# Patient Record
Sex: FEMALE | Race: WHITE | NOT HISPANIC OR LATINO | Employment: UNEMPLOYED | URBAN - NONMETROPOLITAN AREA
[De-identification: names, ages, dates, MRNs, and addresses within clinical notes are randomized per-mention and may not be internally consistent; named-entity substitution may affect disease eponyms.]

---

## 2017-06-29 ENCOUNTER — OFFICE VISIT (OUTPATIENT)
Dept: OBSTETRICS AND GYNECOLOGY | Facility: CLINIC | Age: 67
End: 2017-06-29

## 2017-06-29 VITALS — WEIGHT: 260 LBS | HEIGHT: 65 IN | BODY MASS INDEX: 43.32 KG/M2

## 2017-06-29 DIAGNOSIS — N31.9 NEUROGENIC BLADDER: Chronic | ICD-10-CM

## 2017-06-29 DIAGNOSIS — E66.01 MORBID OBESITY WITH BMI OF 40.0-44.9, ADULT (HCC): Chronic | ICD-10-CM

## 2017-06-29 DIAGNOSIS — M54.16 CHRONIC RADICULAR LUMBAR PAIN: Chronic | ICD-10-CM

## 2017-06-29 DIAGNOSIS — Z01.419 ENCOUNTER FOR ANNUAL ROUTINE GYNECOLOGICAL EXAMINATION: Primary | ICD-10-CM

## 2017-06-29 DIAGNOSIS — N95.1 MENOPAUSAL AND FEMALE CLIMACTERIC STATES: Chronic | ICD-10-CM

## 2017-06-29 DIAGNOSIS — F33.41 RECURRENT MAJOR DEPRESSIVE DISORDER, IN PARTIAL REMISSION (HCC): Chronic | ICD-10-CM

## 2017-06-29 DIAGNOSIS — Z12.31 ENCOUNTER FOR SCREENING MAMMOGRAM FOR BREAST CANCER: ICD-10-CM

## 2017-06-29 DIAGNOSIS — G89.29 CHRONIC RADICULAR LUMBAR PAIN: Chronic | ICD-10-CM

## 2017-06-29 DIAGNOSIS — M79.7 FIBROMYALGIA: Chronic | ICD-10-CM

## 2017-06-29 DIAGNOSIS — E03.9 ACQUIRED HYPOTHYROIDISM: Chronic | ICD-10-CM

## 2017-06-29 DIAGNOSIS — G47.33 OBSTRUCTIVE SLEEP APNEA SYNDROME: Chronic | ICD-10-CM

## 2017-06-29 DIAGNOSIS — G43.C0 PERIODIC HEADACHE SYNDROME, NOT INTRACTABLE: Chronic | ICD-10-CM

## 2017-06-29 PROCEDURE — 88142 CYTOPATH C/V THIN LAYER: CPT | Performed by: OBSTETRICS & GYNECOLOGY

## 2017-06-29 PROCEDURE — G0101 CA SCREEN;PELVIC/BREAST EXAM: HCPCS | Performed by: OBSTETRICS & GYNECOLOGY

## 2017-06-29 RX ORDER — OXYCODONE HYDROCHLORIDE 15 MG/1
TABLET ORAL
Refills: 0 | COMMUNITY
Start: 2017-06-14

## 2017-06-29 RX ORDER — LEVOTHYROXINE SODIUM 112 UG/1
TABLET ORAL
Refills: 11 | COMMUNITY
Start: 2017-06-08

## 2017-06-29 RX ORDER — LEVOCETIRIZINE DIHYDROCHLORIDE 5 MG/1
TABLET, FILM COATED ORAL
Refills: 0 | COMMUNITY
Start: 2017-05-31

## 2017-06-29 RX ORDER — DULOXETIN HYDROCHLORIDE 30 MG/1
CAPSULE, DELAYED RELEASE ORAL
COMMUNITY
Start: 2017-03-22 | End: 2017-08-09 | Stop reason: DRUGHIGH

## 2017-06-29 RX ORDER — FUROSEMIDE 40 MG/1
TABLET ORAL
Refills: 0 | COMMUNITY
Start: 2017-05-31

## 2017-06-29 RX ORDER — RANITIDINE 150 MG/1
TABLET ORAL
Refills: 0 | COMMUNITY
Start: 2017-05-31

## 2017-06-29 RX ORDER — IPRATROPIUM/ALBUTEROL SULFATE 20-100 MCG
MIST INHALER (GRAM) INHALATION
Refills: 0 | COMMUNITY
Start: 2017-04-10

## 2017-06-29 RX ORDER — RALOXIFENE HYDROCHLORIDE 60 MG/1
TABLET, FILM COATED ORAL
Refills: 2 | COMMUNITY
Start: 2017-06-09

## 2017-06-29 RX ORDER — POTASSIUM CHLORIDE 20 MEQ/1
TABLET, EXTENDED RELEASE ORAL
Refills: 11 | COMMUNITY
Start: 2017-05-04

## 2017-06-29 RX ORDER — CYCLOBENZAPRINE HCL 10 MG
TABLET ORAL
Refills: 2 | COMMUNITY
Start: 2017-06-08 | End: 2017-08-09

## 2017-06-29 RX ORDER — LACTULOSE 10 G/15ML
SOLUTION ORAL
Refills: 0 | COMMUNITY
Start: 2017-03-31

## 2017-06-29 RX ORDER — DOCUSATE SODIUM 100 MG/1
CAPSULE ORAL
Refills: 5 | COMMUNITY
Start: 2017-06-12

## 2017-06-29 RX ORDER — PROMETHAZINE HYDROCHLORIDE 25 MG/1
TABLET ORAL
Refills: 0 | COMMUNITY
Start: 2017-05-31

## 2017-06-29 RX ORDER — LIDOCAINE 50 MG/G
PATCH TOPICAL
Refills: 0 | COMMUNITY
Start: 2017-06-12

## 2017-06-29 RX ORDER — BENZONATATE 100 MG/1
CAPSULE ORAL
Refills: 1 | COMMUNITY
Start: 2017-06-08

## 2017-06-29 RX ORDER — BACLOFEN 10 MG/1
TABLET ORAL
Refills: 1 | COMMUNITY
Start: 2017-05-17 | End: 2017-08-09

## 2017-06-29 RX ORDER — PANTOPRAZOLE SODIUM 40 MG/1
TABLET, DELAYED RELEASE ORAL
Refills: 2 | COMMUNITY
Start: 2017-06-08

## 2017-06-29 RX ORDER — BUPROPION HYDROCHLORIDE 150 MG/1
150 TABLET, EXTENDED RELEASE ORAL 2 TIMES DAILY
Qty: 60 TABLET | Refills: 11 | Status: SHIPPED | OUTPATIENT
Start: 2017-06-29

## 2017-06-29 RX ORDER — CEFDINIR 300 MG/1
CAPSULE ORAL
Refills: 0 | COMMUNITY
Start: 2017-06-17 | End: 2017-08-09

## 2017-06-29 RX ORDER — ZIPRASIDONE HYDROCHLORIDE 20 MG/1
CAPSULE ORAL
Refills: 0 | COMMUNITY
Start: 2017-06-07 | End: 2017-08-09

## 2017-06-29 RX ORDER — DULOXETIN HYDROCHLORIDE 60 MG/1
CAPSULE, DELAYED RELEASE ORAL
Refills: 6 | COMMUNITY
Start: 2017-06-09

## 2017-06-29 RX ORDER — CLONAZEPAM 2 MG/1
TABLET ORAL
Refills: 5 | COMMUNITY
Start: 2017-06-08

## 2017-06-29 RX ORDER — CLOTRIMAZOLE AND BETAMETHASONE DIPROPIONATE 10; .64 MG/G; MG/G
CREAM TOPICAL
Refills: 0 | COMMUNITY
Start: 2017-05-31

## 2017-06-29 RX ORDER — DIPHENHYDRAMINE HYDROCHLORIDE 25 MG/1
CAPSULE ORAL
Refills: 12 | COMMUNITY
Start: 2017-06-12

## 2017-06-29 RX ORDER — UBIDECARENONE 100 MG
100 CAPSULE ORAL DAILY
COMMUNITY

## 2017-06-29 RX ORDER — AMOXICILLIN AND CLAVULANATE POTASSIUM 500; 125 MG/1; MG/1
TABLET, FILM COATED ORAL
Refills: 0 | COMMUNITY
Start: 2017-05-08 | End: 2017-08-09

## 2017-06-29 NOTE — PROGRESS NOTES
Estefani Carlos is a 66 y.o. y/o female     Chief Complaint: Establish care, annual GYN exam and Pap smear, severe vaginal atrophy, menopausal syndrome, and depression.    HPI: 66-year-old  with 2  sections who has had a hysterectomy without BSO.  She is the wife of Isai Carlos M.D.   She has severe disease of the lumbar and sacral spine which makes it very difficult for her to walk, and she gets around mainly in a wheelchair.  I have been her OB/GYN doctor since .  She takes Evista because she has a sister who was diagnosed with breast cancer at age 60, and she cannot tolerate bisphosphonates to treat her osteoporosis.  Estefani complains of severe hot flashes as well as severe vaginal dryness.  She has not had a Pap smear or pelvic exam since I performed it in 2009.  I reviewed her GYN records.    Review of Systems   Constitutional: Positive for fatigue and unexpected weight change. Negative for activity change, appetite change, chills, diaphoresis and fever.   Respiratory: Positive for cough.    Cardiovascular: Positive for leg swelling.   Gastrointestinal: Negative for abdominal pain, constipation, diarrhea and nausea.   Genitourinary: Negative for difficulty urinating, dyspareunia, dysuria, menstrual problem, pelvic pain, urgency, vaginal bleeding, vaginal discharge and vaginal pain.   Musculoskeletal: Positive for arthralgias, back pain, gait problem, joint swelling and myalgias.   Neurological: Positive for headaches.   Psychiatric/Behavioral: Positive for dysphoric mood and sleep disturbance. The patient is not nervous/anxious.       Breast ROS: negative    The following portions of the patient's history were reviewed and updated as appropriate: allergies, current medications, past family history, past medical history, past social history, past surgical history and problem list.    Allergies   Allergen Reactions   • Aspirin    • Baclofen    • Biaxin [Clarithromycin]    •  Celebrex [Celecoxib]    • Depakote Er [Divalproex Sodium Er]    • Gabapentin    • Nsaids    • Sulfa Antibiotics           Current Outpatient Prescriptions:   •  amoxicillin-clavulanate (AUGMENTIN) 500-125 MG per tablet, TAKE 1 TABLET BY MOUTH EVERY 12 HOURS, Disp: , Rfl: 0  •  baclofen (LIORESAL) 10 MG tablet, TAKE 1 TABLET BY MOUTH 3 TIMES A DAY, Disp: , Rfl: 1  •  BANOPHEN 25 MG capsule, TAKE 1 CAPSULE BY MOUTH FOUR TIMES DAILY, Disp: , Rfl: 12  •  benzonatate (TESSALON) 100 MG capsule, TAKE 1 CAPSULE BY MOUTH THREE TIMES DAILY, Disp: , Rfl: 1  •  buPROPion SR (WELLBUTRIN SR) 150 MG 12 hr tablet, Take 1 tablet by mouth 2 (Two) Times a Day., Disp: 60 tablet, Rfl: 11  •  cefdinir (OMNICEF) 300 MG capsule, TAKE 1 CAPSULE BY MOUTH TWICE DAILY, Disp: , Rfl: 0  •  ciclopirox (LOPROX) 0.77 % cream, APPLY TO AFFECTED AREA TWICE DAILY, Disp: , Rfl: 0  •  clonazePAM (KlonoPIN) 2 MG tablet, TAKE ONE TO TWO TABLETS BY MOUTH AT BEDTIME AS NEEDED FOR SLEEP, Disp: , Rfl: 5  •  clotrimazole-betamethasone (LOTRISONE) 1-0.05 % cream, APPLY TWICE DAILY, Disp: , Rfl: 0  •  coenzyme Q10 100 MG capsule, Take 100 mg by mouth Daily., Disp: , Rfl:   •  COMBIVENT RESPIMAT  MCG/ACT inhaler, INHALE 1 PUFF BY MOUTH FOUR TIMES DAILY, Disp: , Rfl: 0  •  conjugated estrogens (PREMARIN) 0.625 MG/GM vaginal cream, Insert  into the vagina Daily., Disp: 1 each, Rfl: 11  •  cyclobenzaprine (FLEXERIL) 10 MG tablet, TAKE 1 TABLET EVERY 8 HOURS AS NEEDED FOR MUSCLE PAIN, Disp: , Rfl: 2  •  DOCQLACE 100 MG capsule, TAKE AS DIRECTED, Disp: , Rfl: 5  •  DULoxetine (CYMBALTA) 30 MG capsule, , Disp: , Rfl:   •  DULoxetine (CYMBALTA) 60 MG capsule, TAKE 1 CAPSULE(S) BY MOUTH DAILY, Disp: , Rfl: 6  •  furosemide (LASIX) 40 MG tablet, TAKE 1 TABLET BY MOUTH ONCE DAILY, Disp: , Rfl: 0  •  lactulose (CHRONULAC) 10 GM/15ML solution, TAKE 10-15 MILLILITERS BY MOUTH TWICE DAILY OR AS DIRECTED, Disp: , Rfl: 0  •  levocetirizine (XYZAL) 5 MG tablet, TAKE 1  TABLET BY MOUTH ONCE DAILY, Disp: , Rfl: 0  •  levothyroxine (SYNTHROID, LEVOTHROID) 112 MCG tablet, TAKE 1 TABLET BY MOUTH ONCE DAILY, Disp: , Rfl: 11  •  lidocaine (LIDODERM) 5 %, APPLY 1 PATCH DAILY OR AS DIRECTED, Disp: , Rfl: 0  •  MUCINEX 600 MG 12 hr tablet, TAKE 1 TABLET BY MOUTH TWICE DAILY, Disp: , Rfl: 4  •  oxyCODONE (ROXICODONE) 15 MG immediate release tablet, TAKE 1 TABLET BY MOUTH FOUR TIMES DAILY AS NEEDED FOR 30 DAYS, Disp: , Rfl: 0  •  pantoprazole (PROTONIX) 40 MG EC tablet, TAKE 1 TABLET BY MOUTH ONCE DAILY, Disp: , Rfl: 2  •  potassium chloride (K-DUR,KLOR-CON) 20 MEQ CR tablet, TAKE 1 TABLET BY MOUTH DAILY ONLY IF TAKING LASIX, Disp: , Rfl: 11  •  promethazine (PHENERGAN) 25 MG tablet, TAKE 1 TABLET BY MOUTH EVERY SIX HOURS AS NEEDED FOR NAUSEA AND VOMITING, Disp: , Rfl: 0  •  raloxifene (EVISTA) 60 MG tablet, TAKE 1 TABLET BY MOUTH DAILY, Disp: , Rfl: 2  •  raNITIdine (ZANTAC) 150 MG tablet, TAKE 1 TABLET BY MOUTH TWICE DAILY, Disp: , Rfl: 0  •  verapamil SR (CALAN-SR) 120 MG CR tablet, TAKE 1 TABLET BY MOUTH ONCE DAILY, Disp: , Rfl: 11  •  ziprasidone (GEODON) 20 MG capsule, TAKE 1 CAPSULE BY MOUTH TWICE DAILY AS NEEDED FOR AGITATION, Disp: , Rfl: 0     The patient has a family history of   No family history on file.     Past Medical History:   Diagnosis Date   • Acquired hypothyroidism 6/29/2017   • Chronic radicular lumbar pain 6/29/2017   • Fibromyalgia 6/29/2017   • Menopausal and female climacteric states 6/29/2017   • Morbid obesity with BMI of 40.0-44.9, adult 6/29/2017   • Neurogenic bladder 6/29/2017   • Obstructive sleep apnea syndrome 6/29/2017   • Periodic headache syndrome, not intractable 6/29/2017   • Recurrent major depressive disorder, in partial remission 6/29/2017        OB History     No data available           Social History     Social History   • Marital status:      Spouse name: N/A   • Number of children: N/A   • Years of education: N/A     Occupational History  "  • Not on file.     Social History Main Topics   • Smoking status: Never Smoker   • Smokeless tobacco: Not on file   • Alcohol use Not on file   • Drug use: Not on file   • Sexual activity: Not on file     Other Topics Concern   • Not on file     Social History Narrative   • No narrative on file        No past surgical history on file.     Patient Active Problem List   Diagnosis   • Menopausal and female climacteric states   • Recurrent major depressive disorder, in partial remission   • Morbid obesity with BMI of 40.0-44.9, adult   • Acquired hypothyroidism   • Fibromyalgia   • Obstructive sleep apnea syndrome   • Neurogenic bladder   • Chronic radicular lumbar pain   • Periodic headache syndrome, not intractable        Documented Vitals    06/29/17 1443   Weight: 260 lb (118 kg)   Height: 65\" (165.1 cm)   PainSc: 0-No pain       Physical Exam   Constitutional: She is oriented to person, place, and time. No distress.   Obese white female weighing 260 pounds with BMI of 43.3.   HENT:   Head: Normocephalic and atraumatic.   Eyes: Conjunctivae and EOM are normal. Pupils are equal, round, and reactive to light.   Neck: Normal range of motion. Neck supple. No JVD present. No tracheal deviation present. No thyromegaly present.   Cardiovascular: Normal rate, regular rhythm, normal heart sounds and intact distal pulses.  Exam reveals no gallop and no friction rub.    No murmur heard.  Pulmonary/Chest: Effort normal and breath sounds normal. No stridor. No respiratory distress. She has no wheezes. She has no rales. She exhibits no tenderness. Right breast exhibits no inverted nipple, no mass, no nipple discharge, no skin change and no tenderness. Left breast exhibits no inverted nipple, no mass, no nipple discharge, no skin change and no tenderness. Breasts are symmetrical. There is no breast swelling.   Abdominal: Soft. Bowel sounds are normal. She exhibits no distension and no mass. There is no tenderness. There is no " rebound and no guarding. No hernia. Hernia confirmed negative in the right inguinal area and confirmed negative in the left inguinal area.   Genitourinary: Rectal exam shows no external hemorrhoid, no internal hemorrhoid, no fissure, no mass, no tenderness, anal tone normal and guaiac negative stool. No breast tenderness, discharge or bleeding. No labial fusion. There is no rash, tenderness, lesion or injury on the right labia. There is no rash, tenderness, lesion or injury on the left labia. There is erythema and tenderness in the vagina. No bleeding in the vagina. No foreign body in the vagina. No signs of injury around the vagina. No vaginal discharge found.   Genitourinary Comments: Severe vaginal atrophy.  Pap smear of the vaginal cuff performed.  Cervix surgically absent.  Uterus surgically absent.  Adnexa surgically absent.  Urethral meatus without prolapse or erythema.     Musculoskeletal: Normal range of motion. She exhibits no edema, tenderness or deformity.   Lymphadenopathy:     She has no cervical adenopathy.        Right: No inguinal adenopathy present.        Left: No inguinal adenopathy present.   Neurological: She is alert and oriented to person, place, and time. She has normal reflexes. She displays normal reflexes. No cranial nerve deficit. She exhibits normal muscle tone. Coordination normal.   Skin: Skin is warm and dry. No rash noted. She is not diaphoretic. No erythema. No pallor.   Psychiatric: She has a normal mood and affect. Her behavior is normal. Judgment and thought content normal.   Nursing note and vitals reviewed.       Assessment        Diagnosis Plan   1. Encounter for annual routine gynecological examination  Liquid-based Pap Smear, Screening   2. Menopausal and female climacteric states     3. Recurrent major depressive disorder, in partial remission     4. Morbid obesity with BMI of 40.0-44.9, adult     5. Acquired hypothyroidism     6. Fibromyalgia     7. Obstructive sleep  apnea syndrome     8. Neurogenic bladder     9. Chronic radicular lumbar pain     10. Periodic headache syndrome, not intractable     11. Encounter for screening mammogram for breast cancer  Mammo Screening Digital Tomosynthesis Bilateral With CAD         Plan    1.  Wellbutrin  mg twice a day.  2.  Recommend vitamin D3 and liquid B12.  3.  Premarin vaginal cream.  4.  Handouts on stress reduction, hot flashes, and depression.  5.  Follow-up in 6 weeks.  Follow-up sooner as needed.                  This document has been electronically signed by Demian Andre MD on June 29, 2017 5:23 PM

## 2017-07-07 LAB
LAB AP CASE REPORT: NORMAL
LAB AP GYN ADDITIONAL INFORMATION: NORMAL
LAB AP GYN OTHER FINDINGS: NORMAL
Lab: NORMAL
PATH INTERP SPEC-IMP: NORMAL
STAT OF ADQ CVX/VAG CYTO-IMP: NORMAL

## 2017-08-09 ENCOUNTER — OFFICE VISIT (OUTPATIENT)
Dept: OBSTETRICS AND GYNECOLOGY | Facility: CLINIC | Age: 67
End: 2017-08-09

## 2017-08-09 VITALS
BODY MASS INDEX: 42.85 KG/M2 | SYSTOLIC BLOOD PRESSURE: 135 MMHG | DIASTOLIC BLOOD PRESSURE: 70 MMHG | HEIGHT: 65 IN | WEIGHT: 257.2 LBS

## 2017-08-09 DIAGNOSIS — N31.9 NEUROGENIC BLADDER: Chronic | ICD-10-CM

## 2017-08-09 DIAGNOSIS — N95.1 MENOPAUSAL AND FEMALE CLIMACTERIC STATES: Primary | Chronic | ICD-10-CM

## 2017-08-09 DIAGNOSIS — E66.01 MORBID OBESITY WITH BMI OF 40.0-44.9, ADULT (HCC): Chronic | ICD-10-CM

## 2017-08-09 DIAGNOSIS — N95.2 VAGINAL ATROPHY: Chronic | ICD-10-CM

## 2017-08-09 DIAGNOSIS — F33.41 RECURRENT MAJOR DEPRESSIVE DISORDER, IN PARTIAL REMISSION (HCC): Chronic | ICD-10-CM

## 2017-08-09 PROBLEM — Z90.710 H/O HYSTERECTOMY FOR BENIGN DISEASE: Chronic | Status: ACTIVE | Noted: 2017-08-09

## 2017-08-09 PROCEDURE — 99214 OFFICE O/P EST MOD 30 MIN: CPT | Performed by: OBSTETRICS & GYNECOLOGY

## 2017-08-10 NOTE — PROGRESS NOTES
Estefani Carlos is a 66 y.o. y/o female     Chief Complaint: Lethargy, menopausal syndrome, depression, and severe vaginal atrophy    HPI: 66-year-old  with two  sections who has had a hysterectomy without BSO.  She is the wife of Isai Carlos M.D.   She has severe disease of the lumbar and sacral spine which makes it very difficult for her to walk, and she gets around mainly in a wheelchair.  I have been her OB/GYN doctor since .  She takes Evista because she has a sister who was diagnosed with breast cancer at age 60, and she cannot tolerate bisphosphonates to treat her osteoporosis.  Estefani complains of severe hot flashes as well as severe vaginal dryness.    The Premarin vaginal cream has helped some with the severe vaginal dryness, and the Wellbutrin is helping a little bit with her hot flashes.  She has also been able to lose a little weight.  It is also helping with her depression.    Review of Systems   Constitutional: Positive for fatigue. Negative for activity change, appetite change, chills, diaphoresis, fever and unexpected weight change.   Gastrointestinal: Negative for abdominal pain, constipation, diarrhea and nausea.   Genitourinary: Positive for dyspareunia ( Somewhat improved.) and pelvic pain. Negative for difficulty urinating, dysuria, menstrual problem, urgency, vaginal bleeding, vaginal discharge and vaginal pain.   Musculoskeletal: Positive for arthralgias, back pain, gait problem, joint swelling, myalgias, neck pain and neck stiffness.   Neurological: Negative for headaches.   Psychiatric/Behavioral: Positive for dysphoric mood ( Somewhat improved.). The patient is not nervous/anxious.    All other systems reviewed and are negative.     Breast ROS: negative    The following portions of the patient's history were reviewed and updated as appropriate: allergies, current medications, past family history, past medical history, past social history, past surgical history  and problem list.    Allergies   Allergen Reactions   • Aspirin    • Baclofen    • Biaxin [Clarithromycin]    • Celebrex [Celecoxib]    • Depakote Er [Divalproex Sodium Er]    • Gabapentin    • Nsaids    • Sulfa Antibiotics           Current Outpatient Prescriptions:   •  BANOPHEN 25 MG capsule, TAKE 1 CAPSULE BY MOUTH FOUR TIMES DAILY, Disp: , Rfl: 12  •  benzonatate (TESSALON) 100 MG capsule, TAKE 1 CAPSULE BY MOUTH THREE TIMES DAILY, Disp: , Rfl: 1  •  buPROPion SR (WELLBUTRIN SR) 150 MG 12 hr tablet, Take 1 tablet by mouth 2 (Two) Times a Day., Disp: 60 tablet, Rfl: 11  •  ciclopirox (LOPROX) 0.77 % cream, APPLY TO AFFECTED AREA TWICE DAILY, Disp: , Rfl: 0  •  clonazePAM (KlonoPIN) 2 MG tablet, TAKE ONE TO TWO TABLETS BY MOUTH AT BEDTIME AS NEEDED FOR SLEEP, Disp: , Rfl: 5  •  clotrimazole-betamethasone (LOTRISONE) 1-0.05 % cream, APPLY TWICE DAILY, Disp: , Rfl: 0  •  coenzyme Q10 100 MG capsule, Take 100 mg by mouth Daily., Disp: , Rfl:   •  COMBIVENT RESPIMAT  MCG/ACT inhaler, INHALE 1 PUFF BY MOUTH FOUR TIMES DAILY, Disp: , Rfl: 0  •  conjugated estrogens (PREMARIN) 0.625 MG/GM vaginal cream, Insert  into the vagina Daily., Disp: 1 each, Rfl: 11  •  DOCQLACE 100 MG capsule, TAKE AS DIRECTED, Disp: , Rfl: 5  •  DULoxetine (CYMBALTA) 60 MG capsule, TAKE 1 CAPSULE(S) BY MOUTH DAILY, Disp: , Rfl: 6  •  furosemide (LASIX) 40 MG tablet, TAKE 1 TABLET BY MOUTH ONCE DAILY, Disp: , Rfl: 0  •  lactulose (CHRONULAC) 10 GM/15ML solution, TAKE 10-15 MILLILITERS BY MOUTH TWICE DAILY OR AS DIRECTED, Disp: , Rfl: 0  •  levocetirizine (XYZAL) 5 MG tablet, TAKE 1 TABLET BY MOUTH ONCE DAILY, Disp: , Rfl: 0  •  levothyroxine (SYNTHROID, LEVOTHROID) 112 MCG tablet, TAKE 1 TABLET BY MOUTH ONCE DAILY, Disp: , Rfl: 11  •  lidocaine (LIDODERM) 5 %, APPLY 1 PATCH DAILY OR AS DIRECTED, Disp: , Rfl: 0  •  MUCINEX 600 MG 12 hr tablet, TAKE 1 TABLET BY MOUTH TWICE DAILY, Disp: , Rfl: 4  •  oxyCODONE (ROXICODONE) 15 MG immediate  release tablet, TAKE 1 TABLET BY MOUTH FOUR TIMES DAILY AS NEEDED FOR 30 DAYS, Disp: , Rfl: 0  •  pantoprazole (PROTONIX) 40 MG EC tablet, TAKE 1 TABLET BY MOUTH ONCE DAILY, Disp: , Rfl: 2  •  potassium chloride (K-DUR,KLOR-CON) 20 MEQ CR tablet, TAKE 1 TABLET BY MOUTH DAILY ONLY IF TAKING LASIX, Disp: , Rfl: 11  •  promethazine (PHENERGAN) 25 MG tablet, TAKE 1 TABLET BY MOUTH EVERY SIX HOURS AS NEEDED FOR NAUSEA AND VOMITING, Disp: , Rfl: 0  •  raloxifene (EVISTA) 60 MG tablet, TAKE 1 TABLET BY MOUTH DAILY, Disp: , Rfl: 2  •  raNITIdine (ZANTAC) 150 MG tablet, TAKE 1 TABLET BY MOUTH TWICE DAILY, Disp: , Rfl: 0  •  verapamil SR (CALAN-SR) 120 MG CR tablet, TAKE 1 TABLET BY MOUTH ONCE DAILY, Disp: , Rfl: 11     The patient has a family history of   No family history on file.     Past Medical History:   Diagnosis Date   • Acquired hypothyroidism 6/29/2017   • Chronic radicular lumbar pain 6/29/2017   • Fibromyalgia 6/29/2017   • Menopausal and female climacteric states 6/29/2017   • Morbid obesity with BMI of 40.0-44.9, adult 6/29/2017   • Neurogenic bladder 6/29/2017   • Obstructive sleep apnea syndrome 6/29/2017   • Periodic headache syndrome, not intractable 6/29/2017   • Recurrent major depressive disorder, in partial remission 6/29/2017   • Vaginal atrophy 8/9/2017        OB History     No data available           Social History     Social History   • Marital status:      Spouse name: N/A   • Number of children: N/A   • Years of education: N/A     Occupational History   • Not on file.     Social History Main Topics   • Smoking status: Never Smoker   • Smokeless tobacco: Never Used   • Alcohol use No   • Drug use: No   • Sexual activity: Not on file     Other Topics Concern   • Not on file     Social History Narrative        No past surgical history on file.     Patient Active Problem List   Diagnosis   • Menopausal and female climacteric states   • Recurrent major depressive disorder, in partial remission  "  • Morbid obesity with BMI of 40.0-44.9, adult   • Acquired hypothyroidism   • Fibromyalgia   • Obstructive sleep apnea syndrome   • Neurogenic bladder   • Chronic radicular lumbar pain   • Periodic headache syndrome, not intractable   • Vaginal atrophy        Documented Vitals    08/09/17 1416   BP: 135/70   Weight: 257 lb 3.2 oz (117 kg)   Height: 65\" (165.1 cm)   PainSc:   6   PainLoc: Back       Physical Exam   Constitutional: She is oriented to person, place, and time. No distress.   Obese white female weighing 257.2 pounds with BMI 42.8.   HENT:   Head: Normocephalic and atraumatic.   Eyes: Conjunctivae and EOM are normal. Pupils are equal, round, and reactive to light.   Neck: Normal range of motion. Neck supple. No JVD present. No tracheal deviation present. No thyromegaly present.   Cardiovascular: Normal rate, regular rhythm, normal heart sounds and intact distal pulses.  Exam reveals no gallop and no friction rub.    No murmur heard.  Pulmonary/Chest: Effort normal and breath sounds normal. No stridor. No respiratory distress. She has no wheezes. She has no rales. She exhibits no tenderness.   Abdominal: Soft. Bowel sounds are normal. She exhibits no distension and no mass. There is no tenderness. There is no rebound and no guarding. No hernia.   Musculoskeletal: Normal range of motion. She exhibits no edema, tenderness or deformity.   Lymphadenopathy:     She has no cervical adenopathy.   Neurological: She is alert and oriented to person, place, and time. She has normal reflexes. She displays normal reflexes. No cranial nerve deficit. She exhibits normal muscle tone. Coordination normal.   Skin: Skin is warm and dry. No rash noted. She is not diaphoretic. No erythema. No pallor.   Psychiatric: She has a normal mood and affect. Her behavior is normal. Judgment and thought content normal.   Nursing note and vitals reviewed.       Assessment        Diagnosis Plan   1. Menopausal and female climacteric " states     2. Recurrent major depressive disorder, in partial remission     3. Neurogenic bladder     4. Morbid obesity with BMI of 40.0-44.9, adult     5. Vaginal atrophy           Plan      1. Continue Premarin vaginal cream and Wellbutrin SR.  2. Encouraged in diet and exercise.   3. Follow-up in 3 months..  Follow-up sooner as needed.            This document has been electronically signed by Demian Andre MD on August 9, 2017 7:47 PM

## 2018-10-30 ENCOUNTER — TRANSCRIBE ORDERS (OUTPATIENT)
Dept: PHYSICAL THERAPY | Facility: HOSPITAL | Age: 68
End: 2018-10-30

## 2018-10-30 DIAGNOSIS — M17.0 BILATERAL PRIMARY OSTEOARTHRITIS OF KNEE: Primary | ICD-10-CM

## 2018-11-05 ENCOUNTER — APPOINTMENT (OUTPATIENT)
Dept: PHYSICAL THERAPY | Facility: HOSPITAL | Age: 68
End: 2018-11-05

## 2018-11-07 ENCOUNTER — HOSPITAL ENCOUNTER (OUTPATIENT)
Dept: PHYSICAL THERAPY | Facility: HOSPITAL | Age: 68
Setting detail: THERAPIES SERIES
End: 2018-11-07

## 2018-12-03 ENCOUNTER — TRANSCRIBE ORDERS (OUTPATIENT)
Dept: PHYSICAL THERAPY | Facility: HOSPITAL | Age: 68
End: 2018-12-03

## 2018-12-03 DIAGNOSIS — R10.9 ABDOMINAL PAIN, UNSPECIFIED ABDOMINAL LOCATION: Primary | ICD-10-CM

## 2018-12-17 ENCOUNTER — HOSPITAL ENCOUNTER (OUTPATIENT)
Dept: PHYSICAL THERAPY | Facility: HOSPITAL | Age: 68
Setting detail: THERAPIES SERIES
Discharge: HOME | End: 2018-12-17

## 2018-12-17 DIAGNOSIS — M19.012 PRIMARY OSTEOARTHRITIS OF BOTH SHOULDERS: ICD-10-CM

## 2018-12-17 DIAGNOSIS — M17.0 BILATERAL PRIMARY OSTEOARTHRITIS OF KNEE: Primary | ICD-10-CM

## 2018-12-17 DIAGNOSIS — M19.011 PRIMARY OSTEOARTHRITIS OF BOTH SHOULDERS: ICD-10-CM

## 2018-12-17 PROCEDURE — 97162 PT EVAL MOD COMPLEX 30 MIN: CPT | Performed by: PHYSICAL THERAPIST

## 2018-12-17 PROCEDURE — G8978 MOBILITY CURRENT STATUS: HCPCS | Performed by: PHYSICAL THERAPIST

## 2018-12-17 PROCEDURE — G8979 MOBILITY GOAL STATUS: HCPCS | Performed by: PHYSICAL THERAPIST
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
## 2018-12-20 ENCOUNTER — APPOINTMENT (OUTPATIENT)
Dept: PHYSICAL THERAPY | Facility: HOSPITAL | Age: 68
End: 2018-12-20

## 2018-12-26 ENCOUNTER — APPOINTMENT (OUTPATIENT)
Dept: PHYSICAL THERAPY | Facility: HOSPITAL | Age: 68
End: 2018-12-26

## 2018-12-27 ENCOUNTER — APPOINTMENT (OUTPATIENT)
Dept: PHYSICAL THERAPY | Facility: HOSPITAL | Age: 68
End: 2018-12-27

## 2019-01-07 ENCOUNTER — HOSPITAL ENCOUNTER (OUTPATIENT)
Dept: PHYSICAL THERAPY | Facility: HOSPITAL | Age: 69
Setting detail: THERAPIES SERIES
Discharge: HOME | End: 2019-01-07

## 2019-01-07 DIAGNOSIS — M19.011 PRIMARY OSTEOARTHRITIS OF BOTH SHOULDERS: ICD-10-CM

## 2019-01-07 DIAGNOSIS — M17.0 BILATERAL PRIMARY OSTEOARTHRITIS OF KNEE: Primary | ICD-10-CM

## 2019-01-07 DIAGNOSIS — M19.012 PRIMARY OSTEOARTHRITIS OF BOTH SHOULDERS: ICD-10-CM

## 2019-01-07 PROCEDURE — 97110 THERAPEUTIC EXERCISES: CPT

## 2019-01-09 ENCOUNTER — HOSPITAL ENCOUNTER (OUTPATIENT)
Dept: PHYSICAL THERAPY | Facility: HOSPITAL | Age: 69
Setting detail: THERAPIES SERIES
Discharge: HOME | End: 2019-01-09

## 2019-01-09 DIAGNOSIS — M17.0 BILATERAL PRIMARY OSTEOARTHRITIS OF KNEE: Primary | ICD-10-CM

## 2019-01-09 DIAGNOSIS — M19.011 PRIMARY OSTEOARTHRITIS OF BOTH SHOULDERS: ICD-10-CM

## 2019-01-09 DIAGNOSIS — M19.012 PRIMARY OSTEOARTHRITIS OF BOTH SHOULDERS: ICD-10-CM

## 2019-01-09 PROCEDURE — 97110 THERAPEUTIC EXERCISES: CPT

## 2019-01-09 NOTE — THERAPY TREATMENT NOTE
Outpatient Physical Therapy Ortho Treatment Note  [facility]  Raissa Wells PTA       Patient Name: Estefani Carlos  : 1950  MRN: [id number]  Today's Date: 2019      Visit Date: 2019     Visits: 3/4  Insurance Visits Approved: based on medicare guidelines  Recert Due: 2019  MD Appt: TBD  Pain: pretreatment 0/10; post treatment 5/10 low back   Improvement: pt is subjectively reporting 0% improvement since initial evaluation        Visit Dx:    ICD-10-CM ICD-9-CM   1. Bilateral primary osteoarthritis of knee M17.0 715.16   2. Primary osteoarthritis of both shoulders M19.011 715.11    M19.012        Patient Active Problem List   Diagnosis   • Menopausal and female climacteric states   • Recurrent major depressive disorder, in partial remission (CMS/HCC)   • Morbid obesity with BMI of 40.0-44.9, adult (CMS/[facility])   • Acquired hypothyroidism   • Fibromyalgia   • Obstructive sleep apnea syndrome   • Neurogenic bladder   • Chronic radicular lumbar pain   • Periodic headache syndrome, not intractable   • Vaginal atrophy   • H/O hysterectomy for benign disease        Past Medical History:   Diagnosis Date   • Acquired hypothyroidism 2017   • Bilateral shoulder pain    • Chronic radicular lumbar pain 2017   • DDD (degenerative disc disease), cervical    • Fibromyalgia 2017   • H/O hysterectomy for benign disease 2017    She still has her ovaries.   • Menopausal and female climacteric states 2017   • Morbid obesity with BMI of 40.0-44.9, adult (CMS/HCC) 2017   • Neurogenic bladder 2017   • Obstructive sleep apnea syndrome 2017   • Periodic headache syndrome, not intractable 2017   • Recurrent major depressive disorder, in partial remission (CMS/HCC) 2017   • Vaginal atrophy 2017        Past Surgical History:   Procedure Laterality Date   • BACK SURGERY     •  SECTION     • CHOLECYSTECTOMY     • COLONOSCOPY     •  HYSTERECTOMY     • KNEE ARTHROSCOPY Right    • LUMBAR FUSION         PT Ortho     Row Name 01/09/19 1400       Subjective Comments    Subjective Comments  pt intially states that she is doing ok today. denies pain. states that she was glad that she has a day or so between treatments because she was very sore after the last treatment. states that she went to Dr. Toussaint yesterday and he did a bunch of blood work. states that they got to feeling around her legs and have decided to do a scan of her legs. describes a doppler/ultrasound, and also reports that they are doing a chest xray. she states that Dr. Toussaint mentioned blood clots. After getting out of the pool patient states that her  states they are only looking for vericose veins. States that her back is hurting worse after being in the treatment.   -       Precautions and Contraindications    Precautions/Limitations  fall precautions  -       Subjective Pain    Able to rate subjective pain?  yes  -    Pre-Treatment Pain Level  0  -    Post-Treatment Pain Level  5  -    Subjective Pain Comment  pain is in her back, starts after she is doing some wlaking in the pool  -       Posture/Observations    Posture/Observations Comments  pt is acompanied by her caregiver who gets in the pool iwth her. patient has two large bruises noted on her legs. one on each. right leg is medial and below knee, left is medial and below the knee.   -    Row Name 01/07/19 1400       Subjective Comments    Subjective Comments  pt states that she walks very little regino taking 1-2 steps at a time. states that she uses her power wheelchair in the house. has a bedside commode. rarely goes into the restroom for toileting. questions how long she has to stay in the pool. doesn't feel like she can stay in the pool 30 minutes at all. reports during reatment that her back is starting to hurt. has a caregiver. she will be getting in the pool with her to learn so that she may assist  "the patient upon discharge  -       Precautions and Contraindications    Precautions/Limitations  fall precautions  -       Subjective Pain    Able to rate subjective pain?  yes  -    Pre-Treatment Pain Level  6 left knee  -    Post-Treatment Pain Level  7 low back, \"same\" in her left knee  -      User Key  (r) = Recorded By, (t) = Taken By, (c) = Cosigned By    Initials Name Provider Type     Raissa Wells PTA Physical Therapy Assistant                      PT Assessment/Plan     Row Name 01/09/19 1400          PT Assessment    Assessment Comments  after disucssion with patient regarding upcoming tests and her eluding to the possibility of blood clots, treatment is terminated and will be held until medical documentaiton is presented clearing her of blood clots or medical documentation showing that was not what the tests were intended for and there is no concern for blood clot. patient does have increased pain with minimal treatment in the pool  -        PT Plan    PT Frequency  2x/week pool only  -     PT Plan Comments  hold treatment until medical documentation is provided clearing patient of blood clots of the possibility of blood clots  -       User Key  (r) = Recorded By, (t) = Taken By, (c) = Cosigned By    Initials Name Provider Type     Raissa Wells PTA Physical Therapy Assistant              Exercises     Row Name 01/09/19 1400             Subjective Comments    Subjective Comments  pt intially states that she is doing ok today. denies pain. states that she was glad that she has a day or so between treatments because she was very sore after the last treatment. states that she went to Dr. Toussaint yesterday and he did a bunch of blood work. states that they got to feeling around her legs and have decided to do a scan of her legs. describes a doppler/ultrasound, and also reports that they are doing a chest xray. she states that Dr. Toussaint mentioned blood clots. After getting out of the " pool patient states that her  states they are only looking for vericose veins. States that her back is hurting worse after being in the treatment.   -         Subjective Pain    Able to rate subjective pain?  yes  -      Pre-Treatment Pain Level  0  -      Post-Treatment Pain Level  5  -      Subjective Pain Comment  pain is in her back, starts after she is doing some wlaking in the pool  -         Exercise 1    Exercise Name 1  aqua: Transfer to lift to enter pool  -         Exercise 2    Exercise Name 2  aqua: Walk Fwd, retro, lat  -      Time 2  5 mins each  -         Exercise 3    Exercise Name 3  aqua: Transfer out of pool via lift  -      Time 3  5 mins  -        User Key  (r) = Recorded By, (t) = Taken By, (c) = Cosigned By    Initials Name Provider Type    Raissa Cm PTA Physical Therapy Assistant                         PT OP Goals     Row Name 01/09/19 1400          PT Short Term Goals    STG Date to Achieve  01/07/19  -     STG 1  Pt and caregiver independent with aquatic therapy  -     STG 1 Progress  Ongoing  -     STG 2  Able to tolerate 30 minutes of continuous UE/LE therex in the pool  -     STG 2 Progress  Ongoing  [facility]     STG 3  Able to complete 15-20 reps of each aquatic exercise without minimal fatigue noted  -     STG 3 Progress  [facility]        Time Calculation    PT Goal Re-Cert Due Date  01/07/19  -       User Key  (r) = Recorded By, (t) = Taken By, (c) = Cosigned By    Initials Name Provider Type    Raissa Cm PTA Physical Therapy Assistant          Therapy Education  Given: Posture/body mechanics, Fall prevention and home safety, Pain management  Program: Reinforced  How Provided: Verbal  Provided to: Patient, Caregiver  Level of Understanding: Verbalized, Demonstrated              Time Calculation:   Start Time: 1350  Stop Time: 1425  Time Calculation (min): 35 min  Total Timed Code Minutes- PT: 35 minute(s)    Therapy Charges for  Today     Code Description Service Date Service Provider Modifiers Qty    19574020842 HC PT THER PROC EA 15 MIN 1/9/2019 Raissa Wells PTA GP 2    77781875710 HC PT THER SUPP EA 15 MIN 1/9/2019 Raissa Wells PTA GP 1                    Raissa Wells, DARREN  1/9/2019

## 2019-01-14 ENCOUNTER — TELEPHONE (OUTPATIENT)
Dept: PHYSICAL THERAPY | Facility: HOSPITAL | Age: 69
End: 2019-01-14

## 2019-01-15 NOTE — TELEPHONE ENCOUNTER
Pt's  called stating venous doppler study done and results negative for LE DVT. Dr. Toussaint to fax results to [facility] in [place].

## 2019-01-16 ENCOUNTER — APPOINTMENT (OUTPATIENT)
Dept: PHYSICAL THERAPY | Facility: HOSPITAL | Age: 69
End: 2019-01-16

## 2019-01-18 ENCOUNTER — APPOINTMENT (OUTPATIENT)
Dept: PHYSICAL THERAPY | Facility: HOSPITAL | Age: 69
End: 2019-01-18

## 2019-02-04 ENCOUNTER — TELEPHONE (OUTPATIENT)
Dept: PHYSICAL THERAPY | Facility: HOSPITAL | Age: 69
End: 2019-02-04

## 2019-02-04 NOTE — TELEPHONE ENCOUNTER
Late entry for 1/29/19: Spoke with the patient's  on the phone, who said the pt's MD said to hold skilled PT at this time due to persistent knee pain and edema. Pt scheduled to see MD on 2/1/19. Will restart skilled PT once ok with the pt's MD.

## 2019-04-16 ENCOUNTER — DOCUMENTATION (OUTPATIENT)
Dept: PHYSICAL THERAPY | Facility: HOSPITAL | Age: 69
End: 2019-04-16

## 2019-04-16 DIAGNOSIS — M19.011 PRIMARY OSTEOARTHRITIS OF BOTH SHOULDERS: ICD-10-CM

## 2019-04-16 DIAGNOSIS — M19.012 PRIMARY OSTEOARTHRITIS OF BOTH SHOULDERS: ICD-10-CM

## 2019-04-16 DIAGNOSIS — M17.0 BILATERAL PRIMARY OSTEOARTHRITIS OF KNEE: Primary | ICD-10-CM

## 2019-05-24 ENCOUNTER — OFFICE VISIT (OUTPATIENT)
Dept: ORTHOPEDIC SURGERY | Facility: CLINIC | Age: 69
End: 2019-05-24

## 2019-05-24 VITALS — WEIGHT: 258 LBS | HEIGHT: 65 IN | BODY MASS INDEX: 42.99 KG/M2

## 2019-05-24 DIAGNOSIS — Z87.440 HISTORY OF RECURRENT UTIS: ICD-10-CM

## 2019-05-24 DIAGNOSIS — M17.0 PRIMARY OSTEOARTHRITIS OF BOTH KNEES: ICD-10-CM

## 2019-05-24 DIAGNOSIS — G47.33 OBSTRUCTIVE SLEEP APNEA SYNDROME: ICD-10-CM

## 2019-05-24 DIAGNOSIS — G89.29 CHRONIC PAIN OF LEFT KNEE: Primary | ICD-10-CM

## 2019-05-24 DIAGNOSIS — J39.8 TRACHEOMALACIA, ACQUIRED: ICD-10-CM

## 2019-05-24 DIAGNOSIS — F33.41 RECURRENT MAJOR DEPRESSIVE DISORDER, IN PARTIAL REMISSION (HCC): ICD-10-CM

## 2019-05-24 DIAGNOSIS — N31.9 NEUROGENIC BLADDER: ICD-10-CM

## 2019-05-24 DIAGNOSIS — M25.562 CHRONIC PAIN OF LEFT KNEE: Primary | ICD-10-CM

## 2019-05-24 DIAGNOSIS — E66.01 MORBID OBESITY WITH BMI OF 40.0-44.9, ADULT (HCC): ICD-10-CM

## 2019-05-24 DIAGNOSIS — E03.9 ACQUIRED HYPOTHYROIDISM: ICD-10-CM

## 2019-05-24 PROCEDURE — 20610 DRAIN/INJ JOINT/BURSA W/O US: CPT | Performed by: ORTHOPAEDIC SURGERY

## 2019-05-24 PROCEDURE — 99204 OFFICE O/P NEW MOD 45 MIN: CPT | Performed by: ORTHOPAEDIC SURGERY

## 2019-05-24 RX ADMIN — LIDOCAINE HYDROCHLORIDE 2 ML: 10 INJECTION, SOLUTION EPIDURAL; INFILTRATION; INTRACAUDAL; PERINEURAL at 11:27

## 2019-05-24 RX ADMIN — TRIAMCINOLONE ACETONIDE 40 MG: 40 INJECTION, SUSPENSION INTRA-ARTICULAR; INTRAMUSCULAR at 11:27

## 2019-05-24 NOTE — PROGRESS NOTES
Estefani Carlos is a 68 y.o. female   Primary provider:  Ej Da Silva MD       Chief Complaint   Patient presents with   • Left Knee - Pain       HISTORY OF PRESENT ILLNESS: Patient is here today for left knee pain. Patient brought disc with MRI. Patient states that her pain is 10/10. Patient was sent for xray upon arrival.   She has severe pain in her left knee.  She has had a couple of different evaluations of her knee in relation to potential surgical interventions.  No specific injury that she recalls.  She has tried to steroid injections and Visco supplementation.  She is tried physical therapy with strengthening and conditioning exercises.  She has had pain for multiple years and is now significantly limited in mobility.  Her surgical comorbidities include severe tracheomalacia, morbid obesity, neurogenic bladder with recurrent urinary tract infections, and multiple back surgeries making spinal anesthesia a low likelihood of success.    History of Present Illness     CONCURRENT MEDICAL HISTORY:    Past Medical History:   Diagnosis Date   • Acquired hypothyroidism 6/29/2017   • Bilateral shoulder pain    • Chronic radicular lumbar pain 6/29/2017   • DDD (degenerative disc disease), cervical    • Fibromyalgia 6/29/2017   • H/O hysterectomy for benign disease 8/9/2017    She still has her ovaries.   • Menopausal and female climacteric states 6/29/2017   • Morbid obesity with BMI of 40.0-44.9, adult (CMS/[facility]) 6/29/2017   • Neurogenic bladder 6/29/2017   • Obstructive sleep apnea syndrome 6/29/2017   • Periodic headache syndrome, not intractable 6/29/2017   • Recurrent major depressive disorder, in partial remission (CMS/[facility]) 6/29/2017   • Vaginal atrophy 8/9/2017       Allergies   Allergen Reactions   • Aspirin    • Baclofen    • Biaxin [Clarithromycin]    • Celebrex [Celecoxib]    • Depakote Er [Divalproex Sodium Er]    • Gabapentin    • Nsaids    • Sulfa Antibiotics          Current Outpatient  Medications:   •  BANOPHEN 25 MG capsule, TAKE 1 CAPSULE BY MOUTH FOUR TIMES DAILY, Disp: , Rfl: 12  •  benzonatate (TESSALON) 100 MG capsule, TAKE 1 CAPSULE BY MOUTH THREE TIMES DAILY, Disp: , Rfl: 1  •  buPROPion SR (WELLBUTRIN SR) 150 MG 12 hr tablet, Take 1 tablet by mouth 2 (Two) Times a Day., Disp: 60 tablet, Rfl: 11  •  ciclopirox (LOPROX) 0.77 % cream, APPLY TO AFFECTED AREA TWICE DAILY, Disp: , Rfl: 0  •  clonazePAM (KlonoPIN) 2 MG tablet, TAKE ONE TO TWO TABLETS BY MOUTH AT BEDTIME AS NEEDED FOR SLEEP, Disp: , Rfl: 5  •  clotrimazole-betamethasone (LOTRISONE) 1-0.05 % cream, APPLY TWICE DAILY, Disp: , Rfl: 0  •  coenzyme Q10 100 MG capsule, Take 100 mg by mouth Daily., Disp: , Rfl:   •  COMBIVENT RESPIMAT  MCG/ACT inhaler, INHALE 1 PUFF BY MOUTH FOUR TIMES DAILY, Disp: , Rfl: 0  •  conjugated estrogens (PREMARIN) 0.625 MG/GM vaginal cream, Insert  into the vagina Daily., Disp: 1 each, Rfl: 11  •  DOCQLACE 100 MG capsule, TAKE AS DIRECTED, Disp: , Rfl: 5  •  DULoxetine (CYMBALTA) 60 MG capsule, TAKE 1 CAPSULE(S) BY MOUTH DAILY, Disp: , Rfl: 6  •  furosemide (LASIX) 40 MG tablet, TAKE 1 TABLET BY MOUTH ONCE DAILY, Disp: , Rfl: 0  •  lactulose (CHRONULAC) 10 GM/15ML solution, TAKE 10-15 MILLILITERS BY MOUTH TWICE DAILY OR AS DIRECTED, Disp: , Rfl: 0  •  levocetirizine (XYZAL) 5 MG tablet, TAKE 1 TABLET BY MOUTH ONCE DAILY, Disp: , Rfl: 0  •  levothyroxine (SYNTHROID, LEVOTHROID) 112 MCG tablet, TAKE 1 TABLET BY MOUTH ONCE DAILY, Disp: , Rfl: 11  •  lidocaine (LIDODERM) 5 %, APPLY 1 PATCH DAILY OR AS DIRECTED, Disp: , Rfl: 0  •  MUCINEX 600 MG 12 hr tablet, TAKE 1 TABLET BY MOUTH TWICE DAILY, Disp: , Rfl: 4  •  oxyCODONE (ROXICODONE) 15 MG immediate release tablet, TAKE 1 TABLET BY MOUTH FOUR TIMES DAILY AS NEEDED FOR 30 DAYS, Disp: , Rfl: 0  •  pantoprazole (PROTONIX) 40 MG EC tablet, TAKE 1 TABLET BY MOUTH ONCE DAILY, Disp: , Rfl: 2  •  potassium chloride (K-DUR,KLOR-CON) 20 MEQ CR tablet, TAKE 1  "TABLET BY MOUTH DAILY ONLY IF TAKING LASIX, Disp: , Rfl: 11  •  promethazine (PHENERGAN) 25 MG tablet, TAKE 1 TABLET BY MOUTH EVERY SIX HOURS AS NEEDED FOR NAUSEA AND VOMITING, Disp: , Rfl: 0  •  raloxifene (EVISTA) 60 MG tablet, TAKE 1 TABLET BY MOUTH DAILY, Disp: , Rfl: 2  •  raNITIdine (ZANTAC) 150 MG tablet, TAKE 1 TABLET BY MOUTH TWICE DAILY, Disp: , Rfl: 0  •  verapamil SR (CALAN-SR) 120 MG CR tablet, TAKE 1 TABLET BY MOUTH ONCE DAILY, Disp: , Rfl: 11    Past Surgical History:   Procedure Laterality Date   • BACK SURGERY     •  SECTION     • CHOLECYSTECTOMY     • COLONOSCOPY     • HYSTERECTOMY     • KNEE ARTHROSCOPY Right    • LUMBAR FUSION         History reviewed. No pertinent family history.    Social History     Socioeconomic History   • Marital status:      Spouse name: Not on file   • Number of children: Not on file   • Years of education: Not on file   • Highest education level: Not on file   Tobacco Use   • Smoking status: Never Smoker   • Smokeless tobacco: Never Used   Substance and Sexual Activity   • Alcohol use: No   • Drug use: No        Review of Systems   Constitutional: Negative.    HENT: Positive for postnasal drip.    Eyes: Negative.    Respiratory: Positive for wheezing.         Breathing difficulties   Endocrine: Negative.    Genitourinary: Negative.    Musculoskeletal: Positive for joint swelling.   Skin: Negative.    Allergic/Immunologic: Negative.    Neurological: Negative.    Hematological: Negative.    Psychiatric/Behavioral: Positive for sleep disturbance.       PHYSICAL EXAMINATION:       Ht 165.1 cm (65\")   Wt 117 kg (258 lb)   BMI 42.93 kg/m²     Physical Exam   Constitutional: She is oriented to person, place, and time. She appears well-developed and well-nourished.   Neurological: She is alert and oriented to person, place, and time.   Psychiatric: She has a normal mood and affect. Her behavior is normal. Judgment and thought content normal.       GAIT:     []  " Normal  [x]  Antalgic    Assistive device: []  None  []  Walker     []  Crutches  []  Cane     [x]  Wheelchair  []  Stretcher    Right Knee Exam     Muscle Strength   The patient has normal right knee strength.    Tenderness   Right knee tenderness location: diffuse.    Range of Motion   Extension: 0   Flexion: 120     Tests   Varus: negative Valgus: negative  Drawer:  Anterior - negative    Posterior - negative    Other   Sensation: normal  Pulse: present  Swelling: mild    Comments:  Crepitation on motion.  Mild to moderate pain through arc of motion      Left Knee Exam     Muscle Strength   The patient has normal left knee strength.    Tenderness   Left knee tenderness location: diffuse.    Range of Motion   Extension: -10   Flexion: 100     Tests   Varus: negative Valgus: negative  Drawer:  Anterior - negative     Posterior - negative    Other   Erythema: absent  Sensation: normal  Pulse: present  Swelling: none    Comments:  Crepitation with motion  Mild to moderate pain through arc of motion                Large Joint Arthrocentesis: R knee  Date/Time: 5/24/2019 11:27 AM  Consent given by: patient  Site marked: site marked  Timeout: Immediately prior to procedure a time out was called to verify the correct patient, procedure, equipment, support staff and site/side marked as required   Supporting Documentation  Indications: pain   Procedure Details  Location: knee - R knee  Preparation: Patient was prepped and draped in the usual sterile fashion  Needle size: 22 G  Approach: anteromedial  Medications administered: 40 mg triamcinolone acetonide 40 MG/ML; 2 mL lidocaine PF 1% 1 %  Patient tolerance: patient tolerated the procedure well with no immediate complications    Large Joint Arthrocentesis: L knee  Date/Time: 5/24/2019 11:27 AM  Consent given by: patient  Site marked: site marked  Timeout: Immediately prior to procedure a time out was called to verify the correct patient, procedure, equipment, support  staff and site/side marked as required   Supporting Documentation  Indications: pain   Procedure Details  Location: knee - L knee  Preparation: Patient was prepped and draped in the usual sterile fashion  Needle size: 22 G  Approach: anteromedial  Medications administered: 40 mg triamcinolone acetonide 40 MG/ML; 2 mL lidocaine PF 1% 1 %  Patient tolerance: patient tolerated the procedure well with no immediate complications        MRI LEFT KNEE WITHOUT CONTRAST.  IMPRESSION: No soft tissue mass is identified. There is some edema noted within the subcutaneous fat along the posterolateral aspect of the thigh. There was some arthritic changes of the Left knee. There is abnormal signal within the menisci and there is displacement of the lateral meniscus from the joint space. There appears to be a complete tear of the ACL.     Xr Knee 1 Or 2 View Left    Result Date: 5/25/2019  Narrative: Ordering Provider:  Kai Neely MD Ordering Diagnosis/Indication:  Chronic pain of left knee Procedure:  XR KNEE 1 OR 2 VW LEFT Exam Date:  5/24/19 COMPARISON:  Not applicable, no relevant images available.     Impression:  AP bilateral standing of the knees with lateral of the left knee shows moderate to severe osteoarthritic changes in the left knee with complete medial joint space collapse and moderate lateral joint space collapse.  Marginal osteophytes are noted on both sides of the knee.  The right knee shows acceptable position and alignment with mild arthritic changes.  Mild narrowing of the joint space is noted.  The patellofemoral compartment of the left knee shows significant patellofemoral arthritic change and posterior osteophytes as well. Kai Neely MD 5/24/19     Xr Knee Bilateral Ap Standing    Result Date: 5/25/2019  Narrative: Ordering Provider:  Kai Neely MD Ordering Diagnosis/Indication:  Chronic pain of left knee Procedure:  XR KNEE BILATERAL AP STANDING Exam Date:  5/24/19  COMPARISON:  Not applicable, no relevant images available.     Impression:  AP bilateral standing of the knees with lateral of the left knee shows moderate to severe osteoarthritic changes in the left knee with complete medial joint space collapse and moderate lateral joint space collapse.  Marginal osteophytes are noted on both sides of the knee.  The right knee shows acceptable position and alignment with mild arthritic changes.  Mild narrowing of the joint space is noted.  The patellofemoral compartment of the left knee shows significant patellofemoral arthritic change and posterior osteophytes as well. Kai Neely MD 5/24/19         Body mass index is 42.93 kg/m².  ASSESSMENT:    Diagnoses and all orders for this visit:    Chronic pain of left knee  -     XR Knee 1 or 2 View Left  -     XR Knee Bilateral AP Standing  -     Large Joint Arthrocentesis: R knee  -     Large Joint Arthrocentesis: L knee    Neurogenic bladder    Recurrent major depressive disorder, in partial remission (CMS/HCC)    Morbid obesity with BMI of 40.0-44.9, adult (CMS/[facility])    Obstructive sleep apnea syndrome    Acquired hypothyroidism    Tracheomalacia, acquired    History of recurrent UTIs    Primary osteoarthritis of both knees          PLAN    She has severe arthritic changes in her left knee and mild to moderate arthritic changes in her right knee.  She has significantly limited mobility for multiple reasons.  She is a very poor surgical candidate due to tracheomalacia, morbid obesity, obstructive sleep apnea, as well as having a neurogenic bladder with recurrent urinary tract infections.  In addition, she has had 6 spinal surgeries which make the possibility of spinal anesthesia very low in likelihood of success.  She is a very poor candidate for general anesthesia in an elective setting.  We discussed general strengthening and conditioning exercises.  We also discussed the possibility of PRP injections into both knees  especially in light of surgical intervention being such a poor option.  We discussed proceeding with steroid injection into both knees today since there was really not any other reasonable option.  We will also assess the possibility of PRP injections into both knees.    At least 45 minutes was spent in chart review, x-ray and MRI review, and face-to-face discussion with the patient and her  in regards to treatment options and the complexity of decision making in her case.    Patient's Body mass index is 42.93 kg/m². BMI is above normal parameters. Recommendations include: exercise counseling and nutrition counseling.    Return for recheck.    Kai Neely MD

## 2019-05-27 RX ORDER — LIDOCAINE HYDROCHLORIDE 10 MG/ML
2 INJECTION, SOLUTION EPIDURAL; INFILTRATION; INTRACAUDAL; PERINEURAL
Status: COMPLETED | OUTPATIENT
Start: 2019-05-24 | End: 2019-05-24

## 2019-05-27 RX ORDER — TRIAMCINOLONE ACETONIDE 40 MG/ML
40 INJECTION, SUSPENSION INTRA-ARTICULAR; INTRAMUSCULAR
Status: COMPLETED | OUTPATIENT
Start: 2019-05-24 | End: 2019-05-24

## 2019-07-10 DIAGNOSIS — Z13.820 SCREENING FOR OSTEOPOROSIS: Primary | ICD-10-CM

## 2019-07-10 DIAGNOSIS — M85.9 DISORDER OF BONE DENSITY AND STRUCTURE, UNSPECIFIED: ICD-10-CM

## 2019-07-11 ENCOUNTER — OFFICE VISIT (OUTPATIENT)
Dept: ORTHOPEDIC SURGERY | Facility: CLINIC | Age: 69
End: 2019-07-11

## 2019-07-11 VITALS — HEIGHT: 65 IN | BODY MASS INDEX: 42.99 KG/M2 | WEIGHT: 258 LBS

## 2019-07-11 DIAGNOSIS — M17.0 PRIMARY OSTEOARTHRITIS OF BOTH KNEES: ICD-10-CM

## 2019-07-11 DIAGNOSIS — M25.562 CHRONIC PAIN OF LEFT KNEE: Primary | ICD-10-CM

## 2019-07-11 DIAGNOSIS — G89.29 CHRONIC PAIN OF LEFT KNEE: Primary | ICD-10-CM

## 2019-07-11 PROCEDURE — 0232T NJX PLATELET PLASMA: CPT | Performed by: ORTHOPAEDIC SURGERY

## 2019-07-11 RX ORDER — RISPERIDONE 0.25 MG/1
TABLET ORAL
Refills: 3 | COMMUNITY
Start: 2019-07-08

## 2019-07-11 NOTE — PROGRESS NOTES
"Estefani Carlos is a 68 y.o. female returns for     Chief Complaint   Patient presents with   • Left Knee - Follow-up       HISTORY OF PRESENT ILLNESS: Patient being seen for left knee PRP injection.        CONCURRENT MEDICAL HISTORY:    The following portions of the patient's history were reviewed and updated as appropriate: allergies, current medications, past family history, past medical history, past social history, past surgical history and problem list.     ROS  No fevers or chills.  No chest pain or shortness of air.  No GI or  disturbances.    PHYSICAL EXAMINATION:       Ht 165.1 cm (65\")   Wt 117 kg (258 lb)   BMI 42.93 kg/m²     Physical Exam    GAIT:     []  Normal  []  Antalgic    Assistive device: []  None  []  Walker     []  Crutches  []  Cane     [x]  Wheelchair  []  Stretcher    Ortho Exam        Procedure:  XR KNEE BILATERAL AP STANDING  Exam Date:  5/24/19     COMPARISON:  Not applicable, no relevant images available.     IMPRESSION: AP bilateral standing of the knees with lateral of the left knee shows moderate to severe osteoarthritic changes in the left knee with complete medial joint space collapse and moderate lateral joint space collapse.  Marginal osteophytes are noted on both sides of the knee.  The right knee shows acceptable position and alignment with mild arthritic changes.  Mild narrowing of the joint space is noted.  The patellofemoral compartment of the left knee shows significant patellofemoral arthritic change and posterior osteophytes as well.        Kai Neely MD  5/24/19    Procedure:  XR KNEE 1 OR 2 VW LEFT  Exam Date:  5/24/19     COMPARISON:  Not applicable, no relevant images available.     IMPRESSION: AP bilateral standing of the knees with lateral of the left knee shows moderate to severe osteoarthritic changes in the left knee with complete medial joint space collapse and moderate lateral joint space collapse.  Marginal osteophytes are noted on both " sides of the knee.  The right knee shows acceptable position and alignment with mild arthritic changes.  Mild narrowing of the joint space is noted.  The patellofemoral compartment of the left knee shows significant patellofemoral arthritic change and posterior osteophytes as well.        Kai Neely MD  5/24/19           ASSESSMENT:    Diagnoses and all orders for this visit:    Chronic pain of left knee    Primary osteoarthritis of both knees    Other orders  -     Azilsartan Medoxomil (EDARBI) 40 MG tablet; Take  by mouth.  -     risperiDONE (risperDAL) 0.25 MG tablet; TAKE 1 TABLET BY MOUTH ONCE DAILY FOR 3 DAYS THEN TAKE 1 TABLET TWICE DAILY FOR 7 DAYS THEN TAKE 2 TABLETS TWICE DAILY          PLAN  Patient's Body mass index is 42.93 kg/m². BMI is above normal parameters. Recommendations include: exercise counseling and nutrition counseling.      No Follow-up on file.    Shahida Conklin MA

## 2019-07-11 NOTE — PROGRESS NOTES
"Knee Joint Injection      Patient: Estefani Carlos    YOB: 1950    Chief Complaint   Patient presents with   • Left Knee - Follow-up       History of Present Illness:  Patient is here for an injection.  No new complaints.    Physical Exam: 68 y.o. female  General Appearance:    Alert, cooperative, in no acute distress                   Vitals:    07/11/19 1301   Weight: 117 kg (258 lb)   Height: 165.1 cm (65\")   PainSc: 10-Worst pain ever        Patient is alert and oriented, ×3 no acute distress.  Affect is normal respiratory rate is normal unlabored.  Exam and complaints are unchanged.    Procedure:  Procedures     Time out was performed.   Blood was drawn by lab from the left forearm.  The blood was placed in the centrifuge and spun down per protocol.  6cc of plasma was procured and then injected into the left knee through a medial joint line approach under aseptic conditions.   The patient tolerated the procedure well.    Assessment:    Diagnoses and all orders for this visit:    Chronic pain of left knee    Primary osteoarthritis of both knees    Other orders  -     Azilsartan Medoxomil (EDARBI) 40 MG tablet; Take  by mouth.  -     risperiDONE (risperDAL) 0.25 MG tablet; TAKE 1 TABLET BY MOUTH ONCE DAILY FOR 3 DAYS THEN TAKE 1 TABLET TWICE DAILY FOR 7 DAYS THEN TAKE 2 TABLETS TWICE DAILY  -     Cancel: Large Joint Arthrocentesis        Plan:   Slowly increase activity as tolerated.  Discussed importance of leg strengthening and general conditioning.  Discussed warning signs of injection.  Discussed that purpose of injections was symptom improvement and improved activity.  Also discussed that further treatment options depended on symptoms at followup and length of time of improvement after injections.    Return if symptoms worsen or fail to improve, for recheck.     Patient's Body mass index is 42.93 kg/m². BMI is above normal parameters. Recommendations include: exercise counseling and " nutrition counseling.      Kai Neely MD

## 2019-08-26 DIAGNOSIS — M17.0 PRIMARY OSTEOARTHRITIS OF BOTH KNEES: Primary | ICD-10-CM

## 2019-08-26 DIAGNOSIS — G89.29 CHRONIC PAIN OF LEFT KNEE: ICD-10-CM

## 2019-08-26 DIAGNOSIS — E66.01 MORBID OBESITY WITH BMI OF 40.0-44.9, ADULT (HCC): ICD-10-CM

## 2019-08-26 DIAGNOSIS — G47.33 OBSTRUCTIVE SLEEP APNEA SYNDROME: ICD-10-CM

## 2019-08-26 DIAGNOSIS — M25.562 CHRONIC PAIN OF LEFT KNEE: ICD-10-CM

## 2019-09-11 ENCOUNTER — OFFICE VISIT (OUTPATIENT)
Dept: PHYSICAL THERAPY | Facility: CLINIC | Age: 69
End: 2019-09-11

## 2019-09-11 DIAGNOSIS — E66.01 MORBID OBESITY WITH BMI OF 40.0-44.9, ADULT (HCC): ICD-10-CM

## 2019-09-11 DIAGNOSIS — M25.562 CHRONIC PAIN OF LEFT KNEE: ICD-10-CM

## 2019-09-11 DIAGNOSIS — G89.29 CHRONIC PAIN OF LEFT KNEE: ICD-10-CM

## 2019-09-11 DIAGNOSIS — M19.012 PRIMARY OSTEOARTHRITIS OF BOTH SHOULDERS: Primary | ICD-10-CM

## 2019-09-11 DIAGNOSIS — M19.011 PRIMARY OSTEOARTHRITIS OF BOTH SHOULDERS: Primary | ICD-10-CM

## 2019-09-11 DIAGNOSIS — G47.33 OBSTRUCTIVE SLEEP APNEA SYNDROME: ICD-10-CM

## 2019-09-11 DIAGNOSIS — M17.0 BILATERAL PRIMARY OSTEOARTHRITIS OF KNEE: ICD-10-CM

## 2019-09-11 PROCEDURE — 97110 THERAPEUTIC EXERCISES: CPT | Performed by: PHYSICAL THERAPIST

## 2019-09-11 PROCEDURE — 97162 PT EVAL MOD COMPLEX 30 MIN: CPT | Performed by: PHYSICAL THERAPIST

## 2019-09-11 NOTE — PROGRESS NOTES
Physical Therapy Initial Evaluation and Plan of Care    Patient: Estefani Carlos   : 1950  Diagnosis/ICD-10 Code:  Bilateral primary osteoarthritis of knee [M17.0]  Referring practitioner: Kai Neely, *  Date of Initial Visit: 2019  Today's Date: 2019  Patient seen for 1 sessions           Subjective Questionnaire: LEFS: 9      Subjective Evaluation    History of Present Illness    Subjective comment: Shoulder problems, R knee surgery MMT. Left meniscus is torn. difficulty with breathing. Tracheal malasia. O2 2L not with her today.   Patient Occupation: unemployed, RN retired/disabled. Quality of life: fair    Pain  Current pain rating: 10 (shoulders none)  Location: both knees  Quality: dull ache, tight and pressure  Relieving factors: rest and medications  Aggravating factors: ambulation and standing  Progression: worsening    Social Support  Lives in: one-story house (ramp to enter for [employer],)  Lives with: spouse    Diagnostic Tests  X-ray: abnormal             Objective       Tenderness   Left Knee   Tenderness in the medial joint line.     Active Range of Motion   Left Shoulder   Flexion: 110 degrees   Abduction: 100 degrees   External rotation BTH: T1     Right Shoulder   Flexion: 155 degrees   Abduction: 100 degrees   External rotation BTH: T3   Left Knee   Flexion: 115 degrees   Extension: 0 degrees     Right Knee   Flexion: 118 degrees   Extension: 0 degrees     Strength/Myotome Testing     Left Shoulder     Planes of Motion   Flexion: 4   Extension: 4   Abduction: 4   External rotation at 0°: 4     Right Shoulder     Planes of Motion   Flexion: 4+   Extension: 4   Abduction: 4   External rotation at 0°: 4     Left Knee   Flexion: 4  Extension: 5  Quadriceps contraction: good    Right Knee   Flexion: 4  Extension: 5  Quadriceps contraction: good    Tests     Additional Tests Details  Unable to position for special tests, pt upable to lie supine comfortably due to back  pain and SOA    Ambulation   Weight-Bearing Status   Weight-Bearing Status (Left): full weight bearing   Weight-Bearing Status (Right): full weight-bearing    Distance in feet: 0  Assistive device used: wheelchair    Additional Weight-Bearing Status Details  Power WC at home. Transport chair to MD with caregiver. Transfers with sit pivot same height surface.     Treatment: see flow sheets.    Assessment & Plan     Assessment  Impairments: abnormal gait, abnormal or restricted ROM, activity intolerance, lacks appropriate home exercise program and pain with function  Assessment details: Patient is morbidly obese and significantly deconditioned with decreased mobility in shoulders and knees and would benefit from mobility exercises and gait training transfer activities for improved independence  Prognosis: fair  Functional Limitations: lifting, walking, moving in bed, stooping and reaching behind back  Goals  Plan Goals: 1.  Patient will be independent home exercise program.  2.  Patient will be able to stand for 2 minutes with upper extremity support  3.  Patient will have knee flexion to 120 degrees bilaterally.  4.  Patient will have independent straight leg raise without extensor lag.  5.  Patient to tolerate 30 minutes of exercise without excessive shortness of air maintaining stable vital signs    Plan  Therapy options: will be seen for skilled physical therapy services  Planned therapy interventions: home exercise program, strengthening and gait training  Duration in visits: 20  Plan details: LE and UE ROM and strengthening, scapular stabilization, seated Pelvic neutral exercises. Gait and transfer activities.        Visit Diagnoses:    ICD-10-CM ICD-9-CM   1. Bilateral primary osteoarthritis of knee M17.0 715.16   2. Primary osteoarthritis of both shoulders M19.011 715.11    M19.012    3. Obstructive sleep apnea syndrome G47.33 327.23   4. Morbid obesity with BMI of 40.0-44.9, adult (CMS/[facility]) E66.01 278.01     Z68.41 V85.41   5. Chronic pain of left knee M25.562 719.46    G89.29 338.29       Timed:  Manual Therapy:         mins  55471;  Therapeutic Exercise:    15     mins  86323;     Neuromuscular Aayush:        mins  17794;    Therapeutic Activity:          mins  36403;     Gait Training:           mins  77475;     Ultrasound:          mins  93511;    Electrical Stimulation:         mins  29697 ( );    Untimed:  Electrical Stimulation:         mins  37001 ( );  Mechanical Traction:         mins  09189;     Timed Treatment: 15     mins   Total Treatment:   15     mins  Moderate eval  PT SIGNATURE: Prabha Vera PT DPT   DATE TREATMENT INITIATED: 9/11/2019    Initial Certification  Certification Period: 12/10/2019  I certify that the therapy services are furnished while this patient is under my care.  The services outlined above are required by this patient, and will be reviewed every 90 days.     PHYSICIAN: Kai Neely MD      DATE:     Please sign and return via fax to [phone].. Thank you, [facility].

## 2019-09-16 ENCOUNTER — OFFICE VISIT (OUTPATIENT)
Dept: PHYSICAL THERAPY | Facility: CLINIC | Age: 69
End: 2019-09-16

## 2019-09-16 DIAGNOSIS — M25.562 CHRONIC PAIN OF LEFT KNEE: ICD-10-CM

## 2019-09-16 DIAGNOSIS — M19.011 PRIMARY OSTEOARTHRITIS OF BOTH SHOULDERS: ICD-10-CM

## 2019-09-16 DIAGNOSIS — G47.33 OBSTRUCTIVE SLEEP APNEA SYNDROME: ICD-10-CM

## 2019-09-16 DIAGNOSIS — M17.0 BILATERAL PRIMARY OSTEOARTHRITIS OF KNEE: Primary | ICD-10-CM

## 2019-09-16 DIAGNOSIS — G89.29 CHRONIC PAIN OF LEFT KNEE: ICD-10-CM

## 2019-09-16 DIAGNOSIS — E66.01 MORBID OBESITY WITH BMI OF 40.0-44.9, ADULT (HCC): ICD-10-CM

## 2019-09-16 DIAGNOSIS — M19.012 PRIMARY OSTEOARTHRITIS OF BOTH SHOULDERS: ICD-10-CM

## 2019-09-16 PROCEDURE — 97110 THERAPEUTIC EXERCISES: CPT | Performed by: PHYSICAL THERAPIST

## 2019-09-16 PROCEDURE — 97535 SELF CARE MNGMENT TRAINING: CPT | Performed by: PHYSICAL THERAPIST

## 2019-09-16 NOTE — PROGRESS NOTES
Physical Therapy Daily Progress Note    Patient: Estefani Carlos   : 1950  Diagnosis/ICD-10 Code:  No diagnosis found.  Referring practitioner: Kai Neely, *  Date of Initial Visit: Type: THERAPY  Noted: 2019  Today's Date: 2019  Patient seen for 2 sessions      PT Recheck Due: 10-  PT MD Visit: TBD       Estefani Carlos            Subjective Evaluation    History of Present Illness    Subjective comment: patient states that she has supplemental oxygen at home that she uses if she has to do anything taxing. patient has a portable tank as well.          Objective   See Exercise, Manual, and Modality Logs for complete treatment.       Assessment & Plan     Assessment  Assessment details: With attempts at exercises today patient's O2 sats fall down to 82-84%. Patient is educated extensively on pursed lip breathing and the importance of keeping oxygen levels up while performing exercises. Patient is educated on three exercises for home to be completing while wearing her supplemental oxygen including seated marching, seated LAQ, and seated wand shoulder flexion. Unable to complete further treatment today secondary to patient unable to maintain safe oxygen levels     Goals  Plan Goals: 1.  Patient will be independent home exercise program.  2.  Patient will be able to stand for 2 minutes with upper extremity support  3.  Patient will have knee flexion to 120 degrees bilaterally.  4.  Patient will have independent straight leg raise without extensor lag.  5.  Patient to tolerate 30 minutes of exercise without excessive shortness of air maintaining stable vital signs    Plan  Plan details: Do not complete treatment next visit unless patient bring supplemental oxygen secondary to patient unable to maintain safe oxygen saturation while completing therex.       Progress strengthening /stabilization /functional activity            Timed:  Manual Therapy:         mins   34501;  Therapeutic Exercise:    15   mins  73336;     Neuromuscular Aayush:        mins  64189;    Therapeutic Activity:          mins  06387;     Gait Training:           mins  78823;     Ultrasound:          mins  18965;    Electrical Stimulation:         mins  32177 ( );  Patient Education  __25_ mins  Untimed:  Electrical Stimulation:         mins  95958 ( );  Mechanical Traction:         mins  77656;     Timed Treatment:   40   mins   Total Treatment:     40   mins  Yamileth Wells PTA  Physical Therapist Assistant

## 2019-09-18 ENCOUNTER — TREATMENT (OUTPATIENT)
Dept: PHYSICAL THERAPY | Facility: CLINIC | Age: 69
End: 2019-09-18

## 2019-09-18 DIAGNOSIS — G47.33 OBSTRUCTIVE SLEEP APNEA SYNDROME: ICD-10-CM

## 2019-09-18 DIAGNOSIS — G89.29 CHRONIC PAIN OF LEFT KNEE: ICD-10-CM

## 2019-09-18 DIAGNOSIS — M17.0 BILATERAL PRIMARY OSTEOARTHRITIS OF KNEE: Primary | ICD-10-CM

## 2019-09-18 DIAGNOSIS — M19.012 PRIMARY OSTEOARTHRITIS OF BOTH SHOULDERS: ICD-10-CM

## 2019-09-18 DIAGNOSIS — E66.01 MORBID OBESITY WITH BMI OF 40.0-44.9, ADULT (HCC): ICD-10-CM

## 2019-09-18 DIAGNOSIS — M19.011 PRIMARY OSTEOARTHRITIS OF BOTH SHOULDERS: ICD-10-CM

## 2019-09-18 DIAGNOSIS — M25.562 CHRONIC PAIN OF LEFT KNEE: ICD-10-CM

## 2019-09-18 PROCEDURE — 97110 THERAPEUTIC EXERCISES: CPT | Performed by: PHYSICAL THERAPIST

## 2019-09-18 PROCEDURE — 97530 THERAPEUTIC ACTIVITIES: CPT | Performed by: PHYSICAL THERAPIST

## 2019-09-18 NOTE — PROGRESS NOTES
Physical Therapy Daily Progress Note    Patient: Estefani Carlos   : 1950  Diagnosis/ICD-10 Code:     Diagnosis Plan   1. Bilateral primary osteoarthritis of knee     2. Primary osteoarthritis of both shoulders     3. Obstructive sleep apnea syndrome     4. Morbid obesity with BMI of 40.0-44.9, adult (CMS/[facility])     5. Chronic pain of left knee       Referring practitioner: Kai Neely, *  Date of Initial Visit: Type: THERAPY  Noted: 2019  Today's Date: 2019  Patient seen for 3 sessions      PT Recheck Due: 10-  PT MD Visit: TBD       Estefani Carlos      Subjective Evaluation    History of Present Illness    Subjective comment: doing ok right now.          Objective       Static Posture     Shoulders  Rounded.    Lumbar Spine   Flattened.     Comments  Poor overall postural awareness, relying on seat back of wheelchair for support. When positioned with no seat back on mat table patient sits slumped and is cued for posture throughout to aid in breathing and improve core stability.      See Exercise, Manual, and Modality Logs for complete treatment.       Assessment & Plan     Assessment  Assessment details: Patient presents wearing portable O2 today. O2 SATS at initiation of treatment is 90% with nasal cannula donned incorrectly. Once Nasal cannula was donned appropriately, patient's O2 SATS improved to 98% and remained above 94% all throughout treatment. Patient requires cues for appropriate breathing techniques all throughout treatment being given verbal, and visual cueing. Patient requires cues for safety with transfers from wheelchair to mat table and mat table to wheelchair. Caregiver requires safety cues for locking chair. Added no money's to HEP as well as seated posture with no back rest support during completion of therex.     Goals  Plan Goals: Plan Goals: 1.  Patient will be independent home exercise program.  2.  Patient will be able to stand for 2 minutes with  upper extremity support  3.  Patient will have knee flexion to 120 degrees bilaterally.  4.  Patient will have independent straight leg raise without extensor lag.  5.  Patient to tolerate 30 minutes of exercise without excessive shortness of air maintaining stable vital signs (met while utilizing portable oxygen tank)    Plan  Plan details: Next visit continue to progress as patient tolerates. Add hip add squeeze next visit      Progress strengthening /stabilization /functional activity            Timed:  Manual Therapy:         mins  36708;  Therapeutic Exercise:    25     mins  56235;     Neuromuscular Aayush:        mins  19532;    Therapeutic Activity:    17      mins  94157;     Gait Training:           mins  33763;     Ultrasound:          mins  92568;    Electrical Stimulation:         mins  00394 ( );    Untimed:  Electrical Stimulation:         mins  50554 ( );  Mechanical Traction:         mins  81198;     Timed Treatment:   42   mins   Total Treatment:     42   mins  Yamileth Wells PTA  Physical Therapist Assistant

## 2019-09-25 ENCOUNTER — TREATMENT (OUTPATIENT)
Dept: PHYSICAL THERAPY | Facility: CLINIC | Age: 69
End: 2019-09-25

## 2019-09-25 DIAGNOSIS — M25.562 CHRONIC PAIN OF LEFT KNEE: ICD-10-CM

## 2019-09-25 DIAGNOSIS — M17.0 BILATERAL PRIMARY OSTEOARTHRITIS OF KNEE: Primary | ICD-10-CM

## 2019-09-25 DIAGNOSIS — E66.01 MORBID OBESITY WITH BMI OF 40.0-44.9, ADULT (HCC): ICD-10-CM

## 2019-09-25 DIAGNOSIS — M19.012 PRIMARY OSTEOARTHRITIS OF BOTH SHOULDERS: ICD-10-CM

## 2019-09-25 DIAGNOSIS — G47.33 OBSTRUCTIVE SLEEP APNEA SYNDROME: ICD-10-CM

## 2019-09-25 DIAGNOSIS — G89.29 CHRONIC PAIN OF LEFT KNEE: ICD-10-CM

## 2019-09-25 DIAGNOSIS — M19.011 PRIMARY OSTEOARTHRITIS OF BOTH SHOULDERS: ICD-10-CM

## 2019-09-25 PROCEDURE — 97530 THERAPEUTIC ACTIVITIES: CPT | Performed by: PHYSICAL THERAPIST

## 2019-09-25 PROCEDURE — A9999 DME SUPPLY OR ACCESSORY, NOS: HCPCS | Performed by: PHYSICAL THERAPIST

## 2019-09-25 PROCEDURE — 97110 THERAPEUTIC EXERCISES: CPT | Performed by: PHYSICAL THERAPIST

## 2019-09-25 NOTE — PROGRESS NOTES
Physical Therapy Daily Progress Note    Patient: Estefani Carlos   : 1950  Diagnosis/ICD-10 Code:     Diagnosis Plan   1. Bilateral primary osteoarthritis of knee     2. Primary osteoarthritis of both shoulders     3. Obstructive sleep apnea syndrome     4. Morbid obesity with BMI of 40.0-44.9, adult (CMS/[facility])     5. Chronic pain of left knee       Referring practitioner: Kai Neely, *  Date of Initial Visit: Type: THERAPY  Noted: 2019  Today's Date: 2019  Patient seen for 4 sessions      PT Recheck Due: 10-02-19  PT MD Visit: TBD       Estefani Carlos reports: non compliance with HEP        Subjective Evaluation    History of Present Illness    Subjective comment: states that she has been sick. caretaker states that the patient has bronchitis and that her  has given her a few injections of medication for it. does not feel well but wanted to come to treatment.          Objective   See Exercise, Manual, and Modality Logs for complete treatment.       Assessment & Plan     Assessment  Assessment details: Patient presents on supplemental oxygen set to 3L. Patient's O2 Sats remain between 94-98%. Patient has a temp of 99.4°F today. Able to complete therex but requires max cues to stay on task throughout all of treatment. Patient often forgets what she is doing or supposed to be doing. Requires safety cues for sit to stand with appropriate hand placement. Patient's caregiver requires cues for assisting patient with therex. Patient requires cues for appropriate breathing techniques throughout. Pt reporting non compliance with HEP. Caregiver reporting non compliance with HEP. Issued Tband no money, tband hip abd, tband hamcurls to HEP    Goals  Plan Goals: 1.  Patient will be independent home exercise program. (not met)  2.  Patient will be able to stand for 2 minutes with upper extremity support (not met)  3.  Patient will have knee flexion to 120 degrees bilaterally. (not  met)  4.  Patient will have independent straight leg raise without extensor lag. (not met)  5.  Patient to tolerate 30 minutes of exercise without excessive shortness of air maintaining stable vital signs (met while utilizing portable oxygen tank)    Plan  Plan details: SLR next visit      Progress strengthening /stabilization /functional activity            Timed:  Manual Therapy:         mins  42034;  Therapeutic Exercise:    45     mins  66032;     Neuromuscular Aayush:        mins  50522;    Therapeutic Activity:     10     mins  87650;     Gait Training:           mins  14133;     Ultrasound:          mins  59344;    Electrical Stimulation:         mins  30407 ( );    Untimed:  Electrical Stimulation:         mins  67164 ( );  Mechanical Traction:         mins  00963;     Timed Treatment:   55   mins   Total Treatment:     55   mins  Yamileth Wells PTA  Physical Therapist Assistant

## 2019-10-03 ENCOUNTER — TELEPHONE (OUTPATIENT)
Dept: PHYSICAL THERAPY | Facility: CLINIC | Age: 69
End: 2019-10-03

## 2021-02-09 ENCOUNTER — OFFICE VISIT (OUTPATIENT)
Dept: ORTHOPEDIC SURGERY | Facility: CLINIC | Age: 71
End: 2021-02-09

## 2021-02-09 VITALS — HEIGHT: 65 IN | BODY MASS INDEX: 42.99 KG/M2 | WEIGHT: 258 LBS

## 2021-02-09 DIAGNOSIS — M25.561 ACUTE PAIN OF RIGHT KNEE: Primary | ICD-10-CM

## 2021-02-09 DIAGNOSIS — M25.561 ACUTE PAIN OF RIGHT KNEE: ICD-10-CM

## 2021-02-09 DIAGNOSIS — Z87.440 HISTORY OF RECURRENT UTIS: ICD-10-CM

## 2021-02-09 DIAGNOSIS — R73.03 PREDIABETES: ICD-10-CM

## 2021-02-09 DIAGNOSIS — E66.01 MORBID OBESITY WITH BMI OF 40.0-44.9, ADULT (HCC): ICD-10-CM

## 2021-02-09 DIAGNOSIS — G47.33 OBSTRUCTIVE SLEEP APNEA SYNDROME: ICD-10-CM

## 2021-02-09 DIAGNOSIS — M25.552 LEFT HIP PAIN: Primary | ICD-10-CM

## 2021-02-09 PROCEDURE — 99214 OFFICE O/P EST MOD 30 MIN: CPT | Performed by: ORTHOPAEDIC SURGERY

## 2021-02-09 NOTE — PROGRESS NOTES
Estefani Carlos is a 70 y.o. female   Primary provider:  Ej Da Silva MD       Chief Complaint   Patient presents with   • Pain     Right knee     X-rays done today in office   HISTORY OF PRESENT ILLNESS: Patient is here today for a fall approximately 6 weeks ago. She injured her right knee. She was sent to xray upon arrival. She states that she has since fallen several times. Pain 3/10  She states that she fell around Thanksgiving and has been having pain in her right knee and left hip since that time.  She has a prior history of having an inferior ramus fracture on the right superior ramus fracture on the left as well as prior symphysis fractures.  She developed a large hematoma on the anteromedial aspect of her right knee after this fall and it has slowly and progressively improved.  No fevers or chills.  She initially had x-rays of her hip and of her knee which were negative.  She has pain all the time but continues to have pain with weightbearing involving her left hip.    Fall  Associated symptoms include headaches.   Pain  This is a new problem. The current episode started more than 1 month ago. The problem occurs constantly. The problem has been unchanged. Associated symptoms include arthralgias, headaches, joint swelling and myalgias. The symptoms are aggravated by walking and standing. She has tried ice, heat and rest for the symptoms. The treatment provided no relief.        CONCURRENT MEDICAL HISTORY:    Past Medical History:   Diagnosis Date   • Acquired hypothyroidism 6/29/2017   • Allergic    • Anxiety    • Bilateral shoulder pain    • Cataract    • Chronic constipation    • Chronic diarrhea    • Chronic radicular lumbar pain 6/29/2017   • DDD (degenerative disc disease), cervical    • Fibromyalgia 6/29/2017   • H/O hysterectomy for benign disease 8/9/2017    She still has her ovaries.   • High blood pressure    • Injury of back     6 DIFFERENT SURGERIES   • Injury of neck    •  Menopausal and female climacteric states 6/29/2017   • Morbid obesity with BMI of 40.0-44.9, adult (CMS/[facility]) 6/29/2017   • Neurogenic bladder 6/29/2017   • Obstructive sleep apnea syndrome 6/29/2017   • Periodic headache syndrome, not intractable 6/29/2017   • Recurrent major depressive disorder, in partial remission (CMS/[facility]) 6/29/2017   • Shortness of breath    • Urinary tract infection    • Vaginal atrophy 8/9/2017       Allergies   Allergen Reactions   • Aspirin    • Baclofen    • Biaxin [Clarithromycin]    • Celebrex [Celecoxib]    • Depakote Er [Divalproex Sodium Er]    • Gabapentin    • Nsaids    • Sulfa Antibiotics          Current Outpatient Medications:   •  Azilsartan Medoxomil (EDARBI) 40 MG tablet, Take  by mouth., Disp: , Rfl:   •  BANOPHEN 25 MG capsule, TAKE 1 CAPSULE BY MOUTH FOUR TIMES DAILY, Disp: , Rfl: 12  •  benzonatate (TESSALON) 100 MG capsule, TAKE 1 CAPSULE BY MOUTH THREE TIMES DAILY, Disp: , Rfl: 1  •  buPROPion SR (WELLBUTRIN SR) 150 MG 12 hr tablet, Take 1 tablet by mouth 2 (Two) Times a Day., Disp: 60 tablet, Rfl: 11  •  ciclopirox (LOPROX) 0.77 % cream, APPLY TO AFFECTED AREA TWICE DAILY, Disp: , Rfl: 0  •  clonazePAM (KlonoPIN) 2 MG tablet, TAKE ONE TO TWO TABLETS BY MOUTH AT BEDTIME AS NEEDED FOR SLEEP, Disp: , Rfl: 5  •  clotrimazole-betamethasone (LOTRISONE) 1-0.05 % cream, APPLY TWICE DAILY, Disp: , Rfl: 0  •  coenzyme Q10 100 MG capsule, Take 100 mg by mouth Daily., Disp: , Rfl:   •  COMBIVENT RESPIMAT  MCG/ACT inhaler, INHALE 1 PUFF BY MOUTH FOUR TIMES DAILY, Disp: , Rfl: 0  •  conjugated estrogens (PREMARIN) 0.625 MG/GM vaginal cream, Insert  into the vagina Daily., Disp: 1 each, Rfl: 11  •  DOCQLACE 100 MG capsule, TAKE AS DIRECTED, Disp: , Rfl: 5  •  DULoxetine (CYMBALTA) 60 MG capsule, TAKE 1 CAPSULE(S) BY MOUTH DAILY, Disp: , Rfl: 6  •  furosemide (LASIX) 40 MG tablet, TAKE 1 TABLET BY MOUTH ONCE DAILY, Disp: , Rfl: 0  •  lactulose (CHRONULAC) 10 GM/15ML solution,  TAKE 10-15 MILLILITERS BY MOUTH TWICE DAILY OR AS DIRECTED, Disp: , Rfl: 0  •  levocetirizine (XYZAL) 5 MG tablet, TAKE 1 TABLET BY MOUTH ONCE DAILY, Disp: , Rfl: 0  •  levothyroxine (SYNTHROID, LEVOTHROID) 112 MCG tablet, TAKE 1 TABLET BY MOUTH ONCE DAILY, Disp: , Rfl: 11  •  lidocaine (LIDODERM) 5 %, APPLY 1 PATCH DAILY OR AS DIRECTED, Disp: , Rfl: 0  •  MUCINEX 600 MG 12 hr tablet, TAKE 1 TABLET BY MOUTH TWICE DAILY, Disp: , Rfl: 4  •  oxyCODONE (ROXICODONE) 15 MG immediate release tablet, TAKE 1 TABLET BY MOUTH FOUR TIMES DAILY AS NEEDED FOR 30 DAYS, Disp: , Rfl: 0  •  pantoprazole (PROTONIX) 40 MG EC tablet, TAKE 1 TABLET BY MOUTH ONCE DAILY, Disp: , Rfl: 2  •  potassium chloride (K-DUR,KLOR-CON) 20 MEQ CR tablet, TAKE 1 TABLET BY MOUTH DAILY ONLY IF TAKING LASIX, Disp: , Rfl: 11  •  promethazine (PHENERGAN) 25 MG tablet, TAKE 1 TABLET BY MOUTH EVERY SIX HOURS AS NEEDED FOR NAUSEA AND VOMITING, Disp: , Rfl: 0  •  raloxifene (EVISTA) 60 MG tablet, TAKE 1 TABLET BY MOUTH DAILY, Disp: , Rfl: 2  •  raNITIdine (ZANTAC) 150 MG tablet, TAKE 1 TABLET BY MOUTH TWICE DAILY, Disp: , Rfl: 0  •  risperiDONE (risperDAL) 0.25 MG tablet, TAKE 1 TABLET BY MOUTH ONCE DAILY FOR 3 DAYS THEN TAKE 1 TABLET TWICE DAILY FOR 7 DAYS THEN TAKE 2 TABLETS TWICE DAILY, Disp: , Rfl: 3  •  verapamil SR (CALAN-SR) 120 MG CR tablet, TAKE 1 TABLET BY MOUTH ONCE DAILY, Disp: , Rfl: 11    Past Surgical History:   Procedure Laterality Date   • BACK SURGERY     •  SECTION     • CHOLECYSTECTOMY     • COLONOSCOPY     • HYSTERECTOMY     • KNEE ARTHROSCOPY Right    • LUMBAR FUSION         History reviewed. No pertinent family history.    Social History     Socioeconomic History   • Marital status:      Spouse name: Not on file   • Number of children: Not on file   • Years of education: Not on file   • Highest education level: Not on file   Tobacco Use   • Smoking status: Never Smoker   • Smokeless tobacco: Never Used   Substance and  "Sexual Activity   • Alcohol use: No   • Drug use: No        Review of Systems   Constitutional: Negative.    Eyes: Negative.    Respiratory: Positive for shortness of breath.    Cardiovascular: Negative.    Gastrointestinal: Negative.    Endocrine: Positive for polydipsia.   Genitourinary: Positive for difficulty urinating.   Musculoskeletal: Positive for arthralgias, joint swelling and myalgias.   Skin: Negative.    Allergic/Immunologic: Negative.    Neurological: Positive for headaches.   Hematological: Bruises/bleeds easily.   Psychiatric/Behavioral: Positive for sleep disturbance.       PHYSICAL EXAMINATION:       Ht 165.1 cm (65\")   Wt 117 kg (258 lb)   BMI 42.93 kg/m²     Physical Exam  Constitutional:       Appearance: She is obese.   Neurological:      Mental Status: She is alert.         GAIT:     []  Normal  [x]  Antalgic    Assistive device: []  None  []  Walker     []  Crutches  []  Cane     [x]  Wheelchair  []  Stretcher    Right Knee Exam     Comments:  Relatively large resolving hematoma on the anteromedial aspect of the right knee.  She still has some prominent swelling in this location but no skin changes.  No surrounding erythema.  No fluctuance present.  She does have tenderness with range of motion of her knee but is able to actively extend and flex her knee.  Good distal pulses and sensation grossly.      Left Hip Exam     Comments:  No specific tenderness around her left hip.  She does tolerate logroll test but has pain with active flexion of her hip.  She also has pain with Stinchfield test consistent with groin pain.  Good distal pulses and sensation.                  Xr Knee 1 Or 2 View Right    Result Date: 2/9/2021  Narrative: Ordering Provider:  Kai Neely MD Ordering Diagnosis/Indication:  Acute pain of right knee Procedure:  XR KNEE 1 OR 2 VW RIGHT Exam Date:  2/9/21 COMPARISON:  Todays X-rays were compared to previous images dated May 24, 2019.     Impression:  AP and " lateral of the right knee shows mild medial joint space narrowing and mild diffuse arthritic change.  Maintenance of joint spacing is fairly well-maintained.  No acute bony abnormality is noted.  No significant patellofemoral joint arthritis is noted.  No significant interval change in comparison to prior x-rays. Kai Neely MD 2/9/21           ASSESSMENT:    Diagnoses and all orders for this visit:    Left hip pain  -     CT hip left wo contrast; Future    Acute pain of right knee    Obstructive sleep apnea syndrome    Prediabetes    Morbid obesity with BMI of 40.0-44.9, adult (CMS/[facility])    History of recurrent UTIs          PLAN    He continues to have pain with weightbearing involving the groin of her left hip.  She also has pain with resisted hip flexion.  Is been 6 weeks since her fall.  She initially had negative x-rays.  Plan is to proceed with CT scan of her left hip to rule out occult fracture.  She will continue using walker and wheelchair as necessary for mobility.    She has a resolving hematoma for her right knee.  Continue observation with hot and cold treatment and increase mobility as tolerated.    We discussed possible nonsteroidal anti-inflammatory medications, however, she has history of nephrotic syndrome with prior usage.    Over 30 minutes was spent and face-to-face time with the patient and her  as well as in review of prior x-rays, records, and extensive lab work done recently.     Patient's Body mass index is 42.93 kg/m². BMI is above normal parameters. Recommendations include: exercise counseling and nutrition counseling.  Return for After CT scan..    Kai Neely MD

## 2021-03-02 ENCOUNTER — OFFICE VISIT (OUTPATIENT)
Dept: ORTHOPEDIC SURGERY | Facility: CLINIC | Age: 71
End: 2021-03-02

## 2021-03-02 VITALS — BODY MASS INDEX: 42.99 KG/M2 | HEIGHT: 65 IN | WEIGHT: 258 LBS

## 2021-03-02 DIAGNOSIS — M25.512 CHRONIC PAIN OF BOTH SHOULDERS: ICD-10-CM

## 2021-03-02 DIAGNOSIS — S76.112D QUADRICEPS STRAIN, LEFT, SUBSEQUENT ENCOUNTER: ICD-10-CM

## 2021-03-02 DIAGNOSIS — M79.7 FIBROMYALGIA: ICD-10-CM

## 2021-03-02 DIAGNOSIS — M25.511 CHRONIC PAIN OF BOTH SHOULDERS: ICD-10-CM

## 2021-03-02 DIAGNOSIS — G47.33 OBSTRUCTIVE SLEEP APNEA SYNDROME: ICD-10-CM

## 2021-03-02 DIAGNOSIS — M62.559 ATROPHY OF QUADRICEPS FEMORIS MUSCLE: ICD-10-CM

## 2021-03-02 DIAGNOSIS — M25.552 LEFT HIP PAIN: Primary | ICD-10-CM

## 2021-03-02 DIAGNOSIS — M17.0 PRIMARY OSTEOARTHRITIS OF BOTH KNEES: ICD-10-CM

## 2021-03-02 DIAGNOSIS — N31.9 NEUROGENIC BLADDER: ICD-10-CM

## 2021-03-02 DIAGNOSIS — G89.29 CHRONIC PAIN OF BOTH SHOULDERS: ICD-10-CM

## 2021-03-02 DIAGNOSIS — E66.01 MORBID OBESITY WITH BMI OF 40.0-44.9, ADULT (HCC): ICD-10-CM

## 2021-03-02 PROCEDURE — 99215 OFFICE O/P EST HI 40 MIN: CPT | Performed by: ORTHOPAEDIC SURGERY

## 2021-03-02 NOTE — PROGRESS NOTES
"Estefani Carlos is a 70 y.o. female returns for     Chief Complaint   Patient presents with   • Left Hip - Follow-up   • Results     CT at [facility] 2/24/21       HISTORY OF PRESENT ILLNESS:  Patient has complaints of bilateral shoulder pain which limits her ability to sleep.    She has pain in her upper back as well as continue to have pain in her left hip and pelvis region.  She cannot walk and cannot stand or sit for any period of time due to pain in the left side of her pelvis and in her left hip.  She has had a history of a bulging disc at C5-C6 as well.  She relates her current difficulties do to 2 falls in the last 3 to 4 months.  Her  also reports that she has had slowly progressive worsening of her mobility over the last 2 to 3 years.  She continues to be in pain management with a morphine pump and with oral narcotics as well.     CONCURRENT MEDICAL HISTORY:    The following portions of the patient's history were reviewed and updated as appropriate: allergies, current medications, past family history, past medical history, past social history, past surgical history and problem list.     ROS  No fevers or chills.  No chest pain or shortness of air.  No GI or  disturbances.    PHYSICAL EXAMINATION:       Ht 165.1 cm (65\")   Wt 117 kg (258 lb)   BMI 42.93 kg/m²     Physical Exam  Constitutional:       General: She is not in acute distress.     Appearance: She is well-developed. She is obese. She is not ill-appearing.   Pulmonary:      Effort: Pulmonary effort is normal. No respiratory distress.   Neurological:      Mental Status: She is alert and oriented to person, place, and time.         GAIT:     []  Normal  []  Antalgic    Assistive device: []  None  []  Walker     []  Crutches  []  Cane     [x]  Wheelchair  []  Stretcher    Right Knee Exam     Comments:  Improvement in the resolving hematoma on the anteromedial aspect of her knee.  No surrounding erythema.  No fluctuance present.  " She does have tenderness with range of motion of her knee but is able to actively extend and flex her knee.  Good distal pulses and sensation grossly.      Left Hip Exam     Comments:  No specific tenderness around her left hip.  She does have some tenderness along the lateral aspect of her hip in the vicinity of the greater trochanter.  She does tolerate logroll test but has pain with active flexion of her hip.  She also has pain with Stinchfield test consistent with groin pain.  Good distal pulses and sensation.  Exam and mobility of her hip and knee is difficult due to pain with motion and the inability for her to keep her foot off of the foot rest of the wheelchair or off of the ground.  She has significant atrophy and quadricep muscles in both legs.      Right Shoulder Exam     Tenderness   Right shoulder tenderness location: Diffusely tender.    Range of Motion   Active abduction: 100   Forward flexion: 110     Comments:  Special testing is difficult due to the patient's pain on motion and activity.      Left Shoulder Exam     Tenderness   Left shoulder tenderness location: Diffusely tender.    Range of Motion   Active abduction: 80   Forward flexion: 60     Comments:  Special testing is difficult due to patient's pain on motion and activity.        Patient has pain with exam of both shoulders but on multiple occasions does have active range of motion to 90 degrees of abduction and forward flexion while she is explaining how she fell or other aspects of her issues.       CT Left lower extremity  Roxi Melgoza 2/24/21    Impression:   1. No fracture or dislocation.   2. Degenerative and postsurgical change of the visualized lumbar spine.    Finalized by: Irwin Panda MD           ASSESSMENT:    Diagnoses and all orders for this visit:    Left hip pain  -     Ambulatory Referral to Physical Therapy Evaluate and treat    Obstructive sleep apnea syndrome    Primary osteoarthritis of both knees  -     Ambulatory  "Referral to Physical Therapy Evaluate and treat    Morbid obesity with BMI of 40.0-44.9, adult (CMS/[facility])    Fibromyalgia  -     Ambulatory Referral to Physical Therapy Evaluate and treat    Neurogenic bladder    Chronic pain of both shoulders    Atrophy of quadriceps femoris muscle    Quadriceps strain, left, subsequent encounter          PLAN    PT:   General conditioning   General strength and conditioning   Standing/transfers   Home exercises/chair exercises   Mobility very challenging   Thinking 2x per week with home exercises   If specific symptoms arise then can proceed with     CT neck/CT low back/ etc.    Very limited mobility    She has disuse atrophy of quadriceps muscles on both sides.  The nerve supply to the quadriceps is intact bilaterally.  Physical therapy alone is not sufficient to treat this degree of disuse atrophy.  The treatment area is large with multiple sites which requires the use of a conductive garment for both thighs.  She has had 2 recent falls with a possible tear in the sartorius muscle on the left side.  This has limited her mobility and thus contributed to the atrophy and strength deficits.    We will attempt use of a \"kneeHab\" system for electrodiagnostic functionality to work on the atrophy in her quads.    We discussed the possibility of steroid injections in her shoulder or in the greater trochanteric bursa on the left side, however, she has had low cortisol and has been advised against any further steroid use.  This also includes the possibility of oral steroids.    We discussed the CT of her left hip and the results which show no acute findings.  No surgical indication exists.    Approximately 45 minutes was spent with the patient and her  in discussion of her multiple medical comorbidities, multiple sources of pain, and her general lack of mobility.  The plan is to attempt physical therapy to begin the general conditioning process.    We discussed a 6-week trial to see " how she does with physical therapy and if there are any specific findings and response to therapeutic exercises which might warrant proceeding with further evaluation of her cervical spine, her shoulders, or her lower back.  Ideally, she will begin to show slow gradual improvement.  I also talked with her  in regards to the possibility of her pain management using long-acting oxycodone as a possibility.  That, of course, is up to them.    Patient's Body mass index is 42.93 kg/m². BMI is above normal parameters. Recommendations include: exercise counseling and nutrition counseling.    Return in about 6 weeks (around 4/13/2021) for recheck.    Kai Neely MD

## 2021-09-30 DIAGNOSIS — I10 HYPERTENSION, ESSENTIAL: Primary | ICD-10-CM

## 2021-10-01 ENCOUNTER — LAB (OUTPATIENT)
Dept: LAB | Facility: HOSPITAL | Age: 71
End: 2021-10-01

## 2021-10-01 DIAGNOSIS — I10 HYPERTENSION, ESSENTIAL: ICD-10-CM

## 2021-10-01 LAB
D-DIMER, QUANTITATIVE (MAD,POW, STR): 801 NG/ML (FEU) (ref 0–470)
T4 FREE SERPL-MCNC: 1.31 NG/DL (ref 0.93–1.7)

## 2021-10-01 PROCEDURE — 85379 FIBRIN DEGRADATION QUANT: CPT

## 2021-10-01 PROCEDURE — 84439 ASSAY OF FREE THYROXINE: CPT

## 2021-10-01 PROCEDURE — 36415 COLL VENOUS BLD VENIPUNCTURE: CPT

## 2022-03-11 DIAGNOSIS — R07.9 CHEST PAIN, UNSPECIFIED TYPE: Primary | ICD-10-CM

## 2022-04-01 ENCOUNTER — HOSPITAL ENCOUNTER (OUTPATIENT)
Dept: CARDIOLOGY | Facility: HOSPITAL | Age: 72
Discharge: HOME | End: 2022-04-01

## 2022-04-01 ENCOUNTER — HOSPITAL ENCOUNTER (OUTPATIENT)
Dept: NUCLEAR MEDICINE | Facility: HOSPITAL | Age: 72
Discharge: HOME | End: 2022-04-01

## 2022-04-01 DIAGNOSIS — I48.92 ATRIAL FLUTTER, UNSPECIFIED TYPE: ICD-10-CM

## 2022-04-01 DIAGNOSIS — R07.9 CHEST PAIN, UNSPECIFIED TYPE: ICD-10-CM

## 2022-04-01 PROCEDURE — 93017 CV STRESS TEST TRACING ONLY: CPT

## 2022-04-01 PROCEDURE — 0 TECHNETIUM SESTAMIBI: Performed by: INTERNAL MEDICINE

## 2022-04-01 PROCEDURE — 78452 HT MUSCLE IMAGE SPECT MULT: CPT

## 2022-04-01 PROCEDURE — 93018 CV STRESS TEST I&R ONLY: CPT | Performed by: INTERNAL MEDICINE

## 2022-04-01 PROCEDURE — 0 TECHNETIUM MEDRONATE KIT: Performed by: INTERNAL MEDICINE

## 2022-04-01 PROCEDURE — A9500 TC99M SESTAMIBI: HCPCS | Performed by: INTERNAL MEDICINE

## 2022-04-01 PROCEDURE — A9503 TC99M MEDRONATE: HCPCS | Performed by: INTERNAL MEDICINE

## 2022-04-01 PROCEDURE — 25010000002 REGADENOSON 0.4 MG/5ML SOLUTION: Performed by: INTERNAL MEDICINE

## 2022-04-01 PROCEDURE — 78451 HT MUSCLE IMAGE SPECT SING: CPT | Performed by: INTERNAL MEDICINE

## 2022-04-01 RX ORDER — DILTIAZEM HYDROCHLORIDE 120 MG/1
120 CAPSULE, COATED, EXTENDED RELEASE ORAL DAILY
COMMUNITY

## 2022-04-01 RX ORDER — TC 99M MEDRONATE 20 MG/10ML
11 INJECTION, POWDER, LYOPHILIZED, FOR SOLUTION INTRAVENOUS
Status: DISCONTINUED | OUTPATIENT
Start: 2022-04-01 | End: 2022-04-02 | Stop reason: HOSPADM

## 2022-04-01 RX ORDER — METOPROLOL SUCCINATE 50 MG/1
50 TABLET, EXTENDED RELEASE ORAL DAILY
COMMUNITY

## 2022-04-01 RX ORDER — SODIUM CHLORIDE 0.9 % (FLUSH) 0.9 %
10 SYRINGE (ML) INJECTION ONCE
Status: COMPLETED | OUTPATIENT
Start: 2022-04-01 | End: 2022-04-01

## 2022-04-01 RX ORDER — DIGOXIN 125 MCG
125 TABLET ORAL
COMMUNITY

## 2022-04-01 RX ADMIN — TECHNETIUM TC 99M SESTAMIBI 1 DOSE: 1 INJECTION INTRAVENOUS at 09:35

## 2022-04-01 RX ADMIN — TECHNETIUM TC 99M SESTAMIBI 1 DOSE: 1 INJECTION INTRAVENOUS at 11:19

## 2022-04-01 RX ADMIN — Medication 10 ML: at 11:18

## 2022-04-01 RX ADMIN — REGADENOSON 0.4 MG: 0.08 INJECTION, SOLUTION INTRAVENOUS at 11:18

## 2022-04-01 NOTE — H&P
Cardiology History and Physical Note        Patient Name: Estefani Carlos  Age/Sex: 71 y.o. female  : 1950  MRN: [id number]    Date of Admission : 2022    Primary care Physician: Ej Da Silva MD    Reason for Admission: Chest pain Lexiscan Cardiolite stress test      Subjective:       Chief Complaint: Chest pain    History of Present Illness:  Estefani Carlos is a 71 y.o. female     Height of 5 feet 5 inches weight of 212 pounds with a body mass index of 35 with a past medical history significant for arterial hypertension, hypertensive heart disease, paroxysmal atrial fibrillation on chronic anticoagulation with Eliquis, nonrheumatic mild mitral and tricuspid regurgitation, aortic valve thickening with calcification with aortic sclerosis, left ventricular systolic dysfunction with an ejection fraction 40%, history of fibromyalgia, non-insulin-dependent diabetes, gastroesophageal reflux disease, chronic kidney disease, obstructive sleep apnea on CPAP, history of OMA infection, hypothyroidism, anxiety, history of bronchiectasis, chronic back pain and strong family history for atherosclerotic coronary artery disease.    Patient complains of having symptoms of fatigue and tiredness.  Patient had urinary tract infection and subsequently had received IV fluids.  Patient had lost about 10 pounds weight loss.  Patient on questioning complains of having symptoms of chest discomfort on and off.  Patient has associated symptoms of shortness of breath.  Patient has remained in atrial flutter.    Patient also complains of having short-term memory loss.  Patient complains of having right lower quadrant discomfort.  Patient is currently on amiodarone diltiazem, digoxin and metoprolol for rate control.    Patient on initial evaluation was in atrial fibrillation/flutter with rapid ventricular response which has slowed down with the amiodarone metoprolol and diltiazem.    Patient on questioning  denies any episodes of any bleeding diathesis of hemoptysis hematuria bright red blood per rectum.    Patient blood pressure 140/84 pulse of 74 respiration of 18 oxygen saturation of 96%.    Patient 10 point review of system except for what stated in the history of present is negative.            Concurrent Medical History:  1.  Chest pain shortness of breath palpitation.  2.  Paroxysmal atrial flutter fibrillation.  3.  Arterial hypertension.  4.  Hypertensive heart disease.  5.  Left ventricular systolic dysfunction with an ejection fraction of 40%.  6.  Aortic valve thickening with calcification suggestive of aortic sclerosis.  7.  Nonrheumatic mild mitral and nonrheumatic mild tricuspid regurgitation.  8.  Hyperlipidemia with intolerance to statin.  9.  Obesity with a body mass index of 35.  10.  Obstructive sleep apnea on CPAP.  11.  Non-insulin-dependent diabetes.  12.  Gastroesophageal reflux disease.  13.  History of fibromyalgia.  14.  History of MAC infection treated with ciprofloxacin and INH.  15.  Chronic kidney disease.  16.  Anxiety.  17.  Arthritis.  18.  Chronic radiculopathy pain.  19.  Hypothyroidism.  20.  Work-up for [place]'s disease was negative  21.  History of migraine.        Past Surgical History:  1.  Right knee arthroscopic surgery.  2.  Cholecystectomy.  3.  Hysterectomy.  4.   section x2.  5.  Pain pump placement.  6.  Colonoscopy.  7.  Back surgery with lumbar fusion.  8.  Kyphoplasty at L2 level.      Family History: Family history significant for father and mother who had atherosclerotic coronary artery disease      Social History: No history of tobacco alcohol intake.       Cardiac Risk factor:  1.  Postmenopausal.  2.  Arterial hypertension.  3.  Obesity.  4.  Diabetes.  5.  Family history for coronary artery disease      Allergies:  Allergies   Allergen Reactions   • Aspirin    • Baclofen    • Biaxin [Clarithromycin]    • Celebrex [Celecoxib]    • Depakote Er [Divalproex  Sodium Er]    • Gabapentin    • Nsaids    • Sulfa Antibiotics        Home Medication::  Prior to Admission medications    Medication Sig Start Date End Date Taking? Authorizing Provider   Azilsartan Medoxomil (EDARBI) 40 MG tablet Take  by mouth.    Maryann Reeves MD   BANOPHEN 25 MG capsule TAKE 1 CAPSULE BY MOUTH FOUR TIMES DAILY 6/12/17   Maryann Reeves MD   benzonatate (TESSALON) 100 MG capsule TAKE 1 CAPSULE BY MOUTH THREE TIMES DAILY 6/8/17   Maryann Reeves MD   buPROPion SR (WELLBUTRIN SR) 150 MG 12 hr tablet Take 1 tablet by mouth 2 (Two) Times a Day. 6/29/17   Demian Andre MD   ciclopirox (LOPROX) 0.77 % cream APPLY TO AFFECTED AREA TWICE DAILY 6/15/17   Maryann Reeves MD   clonazePAM (KlonoPIN) 2 MG tablet TAKE ONE TO TWO TABLETS BY MOUTH AT BEDTIME AS NEEDED FOR SLEEP 6/8/17   Maryann Reeves MD   clotrimazole-betamethasone (LOTRISONE) 1-0.05 % cream APPLY TWICE DAILY 5/31/17   Maryann Reeves MD   coenzyme Q10 100 MG capsule Take 100 mg by mouth Daily.    Maryann Reeves MD   COMBIVENT RESPIMAT  MCG/ACT inhaler INHALE 1 PUFF BY MOUTH FOUR TIMES DAILY 4/10/17   Maryann Reeves MD   conjugated estrogens (PREMARIN) 0.625 MG/GM vaginal cream Insert  into the vagina Daily. 12/18/18   Demian Andre MD   DOCQLACE 100 MG capsule TAKE AS DIRECTED 6/12/17   Maryann Reeves MD   DULoxetine (CYMBALTA) 60 MG capsule TAKE 1 CAPSULE(S) BY MOUTH DAILY 6/9/17   Maryann Reeves MD   furosemide (LASIX) 40 MG tablet TAKE 1 TABLET BY MOUTH ONCE DAILY 5/31/17   Maryann Reeves MD   lactulose (CHRONULAC) 10 GM/15ML solution TAKE 10-15 MILLILITERS BY MOUTH TWICE DAILY OR AS DIRECTED 3/31/17   Maryann Reeves MD   levocetirizine (XYZAL) 5 MG tablet TAKE 1 TABLET BY MOUTH ONCE DAILY 5/31/17   Maryann Reeves MD   levothyroxine (SYNTHROID, LEVOTHROID) 112 MCG tablet TAKE 1 TABLET BY MOUTH ONCE DAILY 6/8/17   Maryann Reeves MD    lidocaine (LIDODERM) 5 % APPLY 1 PATCH DAILY OR AS DIRECTED 6/12/17   Maryann Reeves MD   MUCINEX 600 MG 12 hr tablet TAKE 1 TABLET BY MOUTH TWICE DAILY 6/9/17   Maryann Reeves MD   oxyCODONE (ROXICODONE) 15 MG immediate release tablet TAKE 1 TABLET BY MOUTH FOUR TIMES DAILY AS NEEDED FOR 30 DAYS 6/14/17   Maryann Reeves MD   pantoprazole (PROTONIX) 40 MG EC tablet TAKE 1 TABLET BY MOUTH ONCE DAILY 6/8/17   Maryann Reeves MD   potassium chloride (K-DUR,KLOR-CON) 20 MEQ CR tablet TAKE 1 TABLET BY MOUTH DAILY ONLY IF TAKING LASIX 5/4/17   Maryann Reeves MD   promethazine (PHENERGAN) 25 MG tablet TAKE 1 TABLET BY MOUTH EVERY SIX HOURS AS NEEDED FOR NAUSEA AND VOMITING 5/31/17   Maryann Reeves MD   raloxifene (EVISTA) 60 MG tablet TAKE 1 TABLET BY MOUTH DAILY 6/9/17   Maryann Reeves MD   raNITIdine (ZANTAC) 150 MG tablet TAKE 1 TABLET BY MOUTH TWICE DAILY 5/31/17   Maryann Reeves MD   risperiDONE (risperDAL) 0.25 MG tablet TAKE 1 TABLET BY MOUTH ONCE DAILY FOR 3 DAYS THEN TAKE 1 TABLET TWICE DAILY FOR 7 DAYS THEN TAKE 2 TABLETS TWICE DAILY 7/8/19   Maryann Reeves MD   verapamil SR (CALAN-SR) 120 MG CR tablet TAKE 1 TABLET BY MOUTH ONCE DAILY 6/8/17   Maryann Reeves MD         Review of Systems   Constitutional: Positive for fatigue. Negative for chills, fever and unexpected weight change.   HENT: Negative for hearing loss and nosebleeds.    Eyes: Negative for visual disturbance.   Respiratory: Positive for chest tightness and shortness of breath. Negative for cough and wheezing.    Cardiovascular: Negative for chest pain, palpitations and leg swelling.   Gastrointestinal: Negative for abdominal pain, blood in stool, constipation, diarrhea, nausea and vomiting.   Endocrine: Negative for cold intolerance, heat intolerance, polydipsia, polyphagia and polyuria.   Genitourinary: Negative for hematuria.   Musculoskeletal: Positive for back pain. Negative  for joint swelling, myalgias and neck pain.   Skin: Negative for color change, rash and wound.   Neurological: Negative for dizziness, seizures, syncope, light-headedness, numbness and headaches.   Hematological: Does not bruise/bleed easily.         Objective:     There were no vitals taken for this visit.  Blood pressure 140/84 pulse of 74 respiration of 18 oxygen saturation of 96%  Constitutional:       Appearance: Well-developed.   Eyes:      General: No scleral icterus.     Conjunctiva/sclera: Conjunctivae normal.      Pupils: Pupils are equal, round, and reactive to light.   HENT:      Head: Normocephalic and atraumatic.      Left Ear: External ear normal.      Nose: Nose normal.   Neck:      Thyroid: No thyromegaly.      Vascular: No JVD.      Trachea: No tracheal deviation.      Lymphadenopathy: No cervical adenopathy.   Pulmonary:      Breath sounds: Normal breath sounds. No stridor.   Cardiovascular:      Normal rate. Regular rhythm.      Murmurs:  Irregularly irregular S1 and S2 with soft systolic murmur best heard at the apex no appreciable click   Abdominal:      General: Bowel sounds are normal.   Musculoskeletal: Normal range of motion.      Cervical back: Normal range of motion and neck supple. Skin:     General: Skin is warm and dry.   Neurological:      Mental Status: Alert and oriented to person, place, and time.      Deep Tendon Reflexes: Reflexes are normal and symmetric.   Psychiatric:         Behavior: Behavior normal.         Thought Content: Thought content normal.         Judgment: Judgment normal.         Lab Review:           Invalid input(s): LABALBU, PROT                                    EKG:   ECG/EMG Results (last 24 hours)     ** No results found for the last 24 hours. **          Imaging:  Imaging Results (Last 24 Hours)     ** No results found for the last 24 hours. **          I personally viewed and interpreted the patient's EKG/Telemetry data.    Assessment:   1.  Chest pain  shortness of breath palpitation.  2.  Atrial flutter fibrillation.  3.  Arterial hypertension.  4.  Hypertensive heart disease.  5.  Left ventricular systolic dysfunction with an ejection fraction of 40%.  6.  Obesity.  7.  Hyperlipidemia with intolerance to statin.  8.  Chronic anticoagulation with Eliquis.  9.  Obstructive sleep apnea.  10.  Chronic kidney disease          Plan:   1.  Chest pain.  Patient has symptoms of chest pain.  Patient had undergone previous noninvasive evaluation in the remote past.  Patient resting electrocardiogram revealed atrial fibrillation/flutter.  Patient at the present time has been recommended a myocardial perfusion Lexiscan Cardiolite stress test.  Risk-benefit treatment option for the myocardial perfusion Lexiscan Cardiolite stress test were discussed with the patient and an informed consent was obtained.    Myocardial perfusion scintigraphy (MPS)  was recommended to the patient as the best non-invasive modality for the assessment of obstructive CAD.  I  did spend some time discussing the procedure, as well as risks and benefits.  I also informed the patient that the risk of nonfatal MI or major cardiac complication is about  0.02%. The patient was also informed about the risks and benefits of MPS. Patient was also provided with a handout describing the test, as well as patient instructions prior to testing.      I also discussed the results of MPS as divided into risks.The NPV of this test is as high as 98%.  I also specifically discussed the risk of cardiac death with the following percentages:    Low-risk MPS:                          0.5% per year  Mildly abnormal MPS:              2.7%  Moderately abnormal:             2.9%  Severely abnormal:                 4.2%    2.  Atrial fibrillation flutter.  Patient is currently on amiodarone and anticoagulation with Eliquis.  Patient on initial evaluation had episodes of atrial fibrillation with a rapid ventricular response.  Have  discussed at length with the patient consideration for radiofrequency ablation and electrical cardioversion as a treatment option.  Patient at the present time would be continued on the amiodarone, diltiazem, digoxin and Toprol.  Patient has not complained of having any symptoms of palpitation.    3.  Chronic anticoagulation with Eliquis.  Patient has not had any hemoptysis hematuria bright red blood per rectum.  Patient does have history of anemia.  Patient has not complained of having any episodes of any bleeding diathesis.    4.  Arterial hypertension.  Patient blood pressure has been labile though well controlled.  Patient has been counseled to decrease her salt intake and to continue with the present dose of the Toprol and the diltiazem.    5.  Left ventricular systolic dysfunction with an ejection fraction of 40% with aortic valve sclerosis with nonrheumatic mitral and tricuspid regurgitation.  Clinically at the present time patient is euvolemic and not in congestive heart failure.  Patient would be administered diuretic on a as needed basis.    6.  Chronic kidney disease.  Patient renal function has been followed by the primary care physician.    7.  Obesity with a body mass index of 35.  Patient has been counseled on weight reduction lifestyle modification dietary restriction.  Patient has been explained the risk associated with obesity and the occurrence and progression of atherosclerotic coronary artery disease and peripheral vascular disease.    8.  History of fibromyalgia as noted.    9.  Chronic back pain with previous pain pump placement.  Patient is on Cymbalta and Percocet.    10.  Anxiety insomnia.  Patient is on Valium and has been followed by the primary care physician.    The above plan of management were discussed with the patient      Time: time spent in face-to-face evaluation of greater than 55 minutes and interacting and formulating examining and discussing the plan with the patient with 50%  of greater time spent in face-to-face interaction.    Electronically signed by Bienvenido Wolfe MD, 04/01/22, 6:04 AM CDT.      Dictated utilizing Dragon dictation.

## 2022-04-01 NOTE — PROGRESS NOTES
Chest pain.  Patient has symptoms of chest pain.  Patient had undergone previous noninvasive evaluation in the remote past.  Patient resting electrocardiogram revealed atrial fibrillation/flutter.  Patient at the present time has been recommended a myocardial perfusion Lexiscan Cardiolite stress test.  Risk-benefit treatment option for the myocardial perfusion Lexiscan Cardiolite stress test were discussed with the patient and an informed consent was obtained.     Myocardial perfusion scintigraphy (MPS)  was recommended to the patient as the best non-invasive modality for the assessment of obstructive CAD.  I  did spend some time discussing the procedure, as well as risks and benefits.  I also informed the patient that the risk of nonfatal MI or major cardiac complication is about  0.02%. The patient was also informed about the risks and benefits of MPS. Patient was also provided with a handout describing the test, as well as patient instructions prior to testing.       I also discussed the results of MPS as divided into risks.The NPV of this test is as high as 98%.  I also specifically discussed the risk of cardiac death with the following percentages:     Low-risk MPS:                          0.5% per year  Mildly abnormal MPS:              2.7%  Moderately abnormal:             2.9%  Severely abnormal:                 4.2%

## 2022-04-02 LAB
BH CV REST NUCLEAR ISOTOPE DOSE: 11 MCI
BH CV STRESS COMMENTS STAGE 1: NORMAL
BH CV STRESS DOSE REGADENOSON STAGE 1: 0.4
BH CV STRESS DURATION MIN STAGE 1: 0
BH CV STRESS DURATION SEC STAGE 1: 10
BH CV STRESS HR STAGE 1: 76
BH CV STRESS NUCLEAR ISOTOPE DOSE: 34.3 MCI
BH CV STRESS PROTOCOL 1: NORMAL
BH CV STRESS RECOVERY BP: NORMAL MMHG
BH CV STRESS RECOVERY HR: 85 BPM
BH CV STRESS STAGE 1: 1
MAXIMAL PREDICTED HEART RATE: 150 BPM
PERCENT MAX PREDICTED HR: 62.67 %
STRESS BASELINE BP: NORMAL MMHG
STRESS BASELINE HR: 75 BPM
STRESS PERCENT HR: 74 %
STRESS POST ESTIMATED WORKLOAD: 1 METS
STRESS POST EXERCISE DUR SEC: 55 SEC
STRESS POST PEAK BP: NORMAL MMHG
STRESS POST PEAK HR: 94 BPM
STRESS TARGET HR: 128 BPM

## 2022-04-02 NOTE — PROGRESS NOTES
Patient underwent a myocardial perfusion Lexiscan Cardiolite stress test which was suggestive of small area of apical ischemia and a intermediate risk study.  Patient is currently not having symptoms of severe substernal chest pain.  Patient at the present time would be treated medically and not subjected to any invasive evaluation.    Patient and family was informed of the findings.

## 2023-08-06 NOTE — THERAPY TREATMENT NOTE
"    Outpatient Physical Therapy Ortho Treatment Note  [facility]  Raissa Wells, DARREN       Patient Name: Estefani Carlos  : 1950  MRN: [id number]  Today's Date: 2019      Visit Date: 2019     Visits: 2/3  Insurance Visits Approved: based on medicare guidelines  Recert Due: 2019  MD Appt: TBD  Pain: pretreatment 6/10 left knee/10; post treatment 10 low back and \"same\" for left knee  Improvement: pt is subjectively reporting 0% improvement since initial evaluation    Visit Dx:    ICD-10-CM ICD-9-CM   1. Bilateral primary osteoarthritis of knee M17.0 715.16   2. Primary osteoarthritis of both shoulders M19.011 715.11    M19.012        Patient Active Problem List   Diagnosis   • Menopausal and female climacteric states   • Recurrent major depressive disorder, in partial remission (CMS/[facility])   • Morbid obesity with BMI of 40.0-44.9, adult (CMS/[facility])   • Acquired hypothyroidism   • Fibromyalgia   • Obstructive sleep apnea syndrome   • Neurogenic bladder   • Chronic radicular lumbar pain   • Periodic headache syndrome, not intractable   • Vaginal atrophy   • H/O hysterectomy for benign disease        Past Medical History:   Diagnosis Date   • Acquired hypothyroidism 2017   • Bilateral shoulder pain    • Chronic radicular lumbar pain 2017   • DDD (degenerative disc disease), cervical    • Fibromyalgia 2017   • H/O hysterectomy for benign disease 2017    She still has her ovaries.   • Menopausal and female climacteric states 2017   • Morbid obesity with BMI of 40.0-44.9, adult (CMS/HCC) 2017   • Neurogenic bladder 2017   • Obstructive sleep apnea syndrome 2017   • Periodic headache syndrome, not intractable 2017   • Recurrent major depressive disorder, in partial remission (CMS/HCC) 2017   • Vaginal atrophy 2017        Past Surgical History:   Procedure Laterality Date   • BACK SURGERY     •  SECTION     • " "CHOLECYSTECTOMY     • COLONOSCOPY     • HYSTERECTOMY     • KNEE ARTHROSCOPY Right    • LUMBAR FUSION         PT Ortho     Row Name 01/07/19 1400       Subjective Comments    Subjective Comments  pt states that she walks very little regino taking 1-2 steps at a time. states that she uses her power wheelchair in the house. has a bedside commode. rarely goes into the restroom for toileting. questions how long she has to stay in the pool. doesn't feel like she can stay in the pool 30 minutes at all. reports during reatment that her back is starting to hurt. has a caregiver. she will be getting in the pool with her to learn so that she may assist the patient upon discharge  -       Precautions and Contraindications    Precautions/Limitations  fall precautions  -       Subjective Pain    Able to rate subjective pain?  yes  -    Pre-Treatment Pain Level  6 left knee  -    Post-Treatment Pain Level  7 low back, \"same\" in her left knee  -      User Key  (r) = Recorded By, (t) = Taken By, (c) = Cosigned By    Initials Name Provider Type    Raissa Cm PTA Physical Therapy Assistant                      PT Assessment/Plan     Row Name 01/07/19 1500          PT Assessment    Assessment Comments  patient has increased back pain with treatment today but is able to tolerate 43 minutes of treatment before she needs to stop secondary to pain. good technique with all therex  -        PT Plan    PT Frequency  2x/week pool only  -     PT Plan Comments  pt's caregiver to get in the pool next visit for education and to work on goal of caregiver/pt independent   -       User Key  (r) = Recorded By, (t) = Taken By, (c) = Cosigned By    Initials Name Provider Type    Raissa Cm PTA Physical Therapy Assistant              Exercises     Row Name 01/07/19 1400             Subjective Comments    Subjective Comments  pt states that she walks very little regino taking 1-2 steps at a time. states that she uses her power " "wheelchair in the house. has a bedside commode. rarely goes into the restroom for toileting. questions how long she has to stay in the pool. doesn't feel like she can stay in the pool 30 minutes at all. reports during reatment that her back is starting to hurt. has a caregiver. she will be getting in the pool with her to learn so that she may assist the patient upon discharge  -         Subjective Pain    Able to rate subjective pain?  yes  -      Pre-Treatment Pain Level  6 left knee  -      Post-Treatment Pain Level  7 low back, \"same\" in her left knee  -         Aquatics    Aquatics performed?  Yes  -         Exercise 1    Exercise Name 1  aqua: Walk fwd, retrol, lat   -      Time 1  5 minutes each  -         Exercise 2    Exercise Name 2  aqua: B Hip Flex SLR  -      Reps 2  10  -         Exercise 3    Exercise Name 3  aqua: B Hip Ext SLR  -      Reps 3  10  -         Exercise 4    Exercise Name 4  aqua: B Hip ABD SLR  -      Reps 4  10  -         Exercise 5    Exercise Name 5  aqua: Wand Flexion  -      Reps 5  10  -         Exercise 6    Exercise Name 6  aqua: Wand [place] Faces (alt ABD)  -      Reps 6  10  -         Exercise 7    Exercise Name 7  aqua: B Shld Horizontal ABD  -      Reps 7  10  -         Exercise 8    Exercise Name 8  aqua: B [place]  -        User Key  (r) = Recorded By, (t) = Taken By, (c) = Cosigned By    Initials Name Provider Type    Raissa Cm PTA Physical Therapy Assistant                         PT OP Goals     Row Name 01/07/19 1500          PT Short Term Goals    STG Date to Achieve  01/07/19  -     STG 1  Pt and caregiver independent with aquatic therapy  -     STG 1 Progress  Ongoing  -     STG 2  Able to tolerate 30 minutes of continuous UE/LE therex in the pool  -     STG 2 Progress  Ongoing  -     STG 3  Able to complete 15-20 reps of each aquatic exercise without minimal fatigue noted  -     STG 3 Progress  " Ongoing  -        Time Calculation    PT Goal Re-Cert Due Date  01/07/19  -       User Key  (r) = Recorded By, (t) = Taken By, (c) = Cosigned By    Initials Name Provider Type     Raissa Wells PTA Physical Therapy Assistant                         Time Calculation:   Start Time: 1405  Stop Time: 1448  Time Calculation (min): 43 min  Total Timed Code Minutes- PT: 43 minute(s)    Therapy Charges for Today     Code Description Service Date Service Provider Modifiers Qty    82439529140 HC PT THER PROC EA 15 MIN 1/7/2019 Raissa Wells PTA GP 3    46741205711 HC PT THER SUPP EA 15 MIN 1/7/2019 Raissa Wells PTA GP 1                    Raissa Wells PTA  1/7/2019      0